# Patient Record
Sex: FEMALE | Race: WHITE | NOT HISPANIC OR LATINO | Employment: UNEMPLOYED | URBAN - NONMETROPOLITAN AREA
[De-identification: names, ages, dates, MRNs, and addresses within clinical notes are randomized per-mention and may not be internally consistent; named-entity substitution may affect disease eponyms.]

---

## 2023-07-27 ENCOUNTER — HOSPITAL ENCOUNTER (OUTPATIENT)
Dept: GENERAL RADIOLOGY | Facility: HOSPITAL | Age: 33
Discharge: HOME | End: 2023-07-27
Admitting: NURSE PRACTITIONER
Payer: COMMERCIAL

## 2023-07-27 ENCOUNTER — OFFICE VISIT (OUTPATIENT)
Dept: UROLOGY | Facility: CLINIC | Age: 33
End: 2023-07-27
Payer: COMMERCIAL

## 2023-07-27 VITALS
HEIGHT: 65 IN | DIASTOLIC BLOOD PRESSURE: 77 MMHG | HEART RATE: 92 BPM | SYSTOLIC BLOOD PRESSURE: 137 MMHG | WEIGHT: 269.6 LBS | BODY MASS INDEX: 44.92 KG/M2

## 2023-07-27 DIAGNOSIS — R10.9 LEFT FLANK PAIN: ICD-10-CM

## 2023-07-27 DIAGNOSIS — N20.1 LEFT URETERAL STONE: Primary | ICD-10-CM

## 2023-07-27 DIAGNOSIS — N30.01 ACUTE CYSTITIS WITH HEMATURIA: ICD-10-CM

## 2023-07-27 PROCEDURE — 74018 RADEX ABDOMEN 1 VIEW: CPT | Performed by: RADIOLOGY

## 2023-07-27 PROCEDURE — 99204 OFFICE O/P NEW MOD 45 MIN: CPT | Performed by: NURSE PRACTITIONER

## 2023-07-27 PROCEDURE — 1160F RVW MEDS BY RX/DR IN RCRD: CPT | Performed by: NURSE PRACTITIONER

## 2023-07-27 PROCEDURE — 1159F MED LIST DOCD IN RCRD: CPT | Performed by: NURSE PRACTITIONER

## 2023-07-27 PROCEDURE — 74018 RADEX ABDOMEN 1 VIEW: CPT

## 2023-07-27 RX ORDER — ALBUTEROL SULFATE 90 UG/1
2 AEROSOL, METERED RESPIRATORY (INHALATION) EVERY 4 HOURS PRN
COMMUNITY
Start: 2023-04-18

## 2023-07-27 RX ORDER — OMEPRAZOLE 40 MG/1
40 CAPSULE, DELAYED RELEASE ORAL DAILY
Qty: 30 CAPSULE | Refills: 2 | COMMUNITY
Start: 2023-06-07 | End: 2023-09-05

## 2023-07-27 NOTE — PROGRESS NOTES
Chief Complaint  LEFT URETERAL STONE/FLANK PAIN (NEW PATIENT WITH UVJ STONE)    Willis Lisa presents to [facility] GASTROENTEROLOGY & UROLOGY UVJ STONE-RESOLVED/ACUTE CYSTITIS/FLANK PAIN  History of Present Illness    Mrs. Romeo Lisa is a pleasant 32-year-old female who presented to the clinic today for evaluation as a new patient. The patient has been referred to the clinic by her PCP secondary to concerns about kidney stones. The patient is also on ER follow-up after initially presenting to [facility] in [place], [place], on 06/29/2023 secondary to complaints of left lower quadrant abdominal pain, side pain, and left flank pain radiating to her lower back. Patient had reported her pain and discomfort started about a month ago, initially intermittent, but had progressed to constant 10/10, radiating into her pelvic region and suprapubic area, causing some discomfort and increased difficulty finding any comfortable lying, or sitting positions.     The patient had bariatric surgery over 6 weeks prior to incident in June, and was doing relatively well, but it was reported that her symptoms had resumed. She also had a UTI 6 weeks prior to that, which was treated with antibiotics and caused some nausea, vomiting, and chills without any fevers.     As such, she had a CT scan of the abdomen and chest completed, which I have reviewed. A CT scan on 06/29/2023 showed a 5-mm stone in the mid- to distal left ureter, causing mild hydroureteronephrosis. There were no stones noted in the left kidney. There were no right kidney or ureteral stones evident. There were no other acute findings evident. She had an incidental single solid nodule that was 6 mm.     On initial presentation in clinic today, she was in no apparent discomfort. She is unable to give any urine for analysis, patient reports she urinated prior to clinic appointment time.  Her PVR 0 mL.  Overall, the  "patient states that she is doing well. She is unsure if she has passed the stone. She denies any current symptoms. She denies any urinary frequency, dysuria, urgency, or hematuria. She states that this is her first kidney stone. She notes that her mother had kidney stones. She has not had a soda in 2 months. She had a gastric sleeve on 06/06/2023. Her appetite is okay.    Her repeat KUB today is also unremarkable with no ureteral stones noted.  Patient most likely passed a stone.      CT ABD/CHEST COMPLETED 06/29/23    1.   5 mm stone in the mid to distal left ureter with mild left hydroureteronephrosis.        2.   No acute posttraumatic findings in the chest, abdomen, or pelvis.    3. Left adrenal adenoma.    4. Incidental single solid nodule <6 mm:     IMPRESSION KUB 07/27/23:    No acute findings.  Vascular phleboliths in the pelvis.    Patient reports a history of uterine cancer diagnosed in 2021, post partial hysterectomy in 2022.     The rest of her PMHx as noted below      Active Ambulatory Problems     Diagnosis Date Noted    Left ureteral stone 07/27/2023    Left flank pain 07/27/2023     Resolved Ambulatory Problems     Diagnosis Date Noted    No Resolved Ambulatory Problems     No Additional Past Medical History      Objective   Vital Signs:   /77   Pulse 92   Ht 165.1 cm (65\")   Wt 122 kg (269 lb 9.6 oz)   BMI 44.86 kg/m²       ROS:   Review of Systems   Constitutional:  Positive for activity change and fatigue. Negative for appetite change, chills, diaphoresis, fever, unexpected weight gain and unexpected weight loss.   HENT:  Negative for congestion, ear discharge, ear pain, nosebleeds, rhinorrhea, sinus pressure and sore throat.    Eyes:  Negative for blurred vision, double vision, photophobia, pain, redness and visual disturbance.   Respiratory:  Negative for apnea, cough, chest tightness, shortness of breath, wheezing and stridor.    Cardiovascular:  Negative for chest pain and " palpitations.   Gastrointestinal:  Positive for abdominal distention, abdominal pain and constipation. Negative for diarrhea, nausea and vomiting.   Endocrine: Negative for polydipsia, polyphagia and polyuria.   Genitourinary:  Positive for pelvic pain, pelvic pressure and urgency. Negative for decreased urine volume, difficulty urinating, dyspareunia, dysuria, flank pain, frequency, genital sores, hematuria, urinary incontinence and vaginal discharge.   Musculoskeletal:  Negative for arthralgias, back pain and joint swelling.   Skin:  Positive for color change and pallor. Negative for rash and wound.   Neurological:  Negative for dizziness, tremors, syncope, weakness, light-headedness, headache, memory problem and confusion.   Psychiatric/Behavioral:  Positive for sleep disturbance and stress. Negative for behavioral problems and decreased concentration.       Physical Exam  Constitutional:       General: She is in acute distress.      Appearance: She is well-developed.   HENT:      Head: Normocephalic and atraumatic.   Eyes:      Pupils: Pupils are equal, round, and reactive to light.   Neck:      Thyroid: No thyromegaly.      Trachea: No tracheal deviation.   Cardiovascular:      Rate and Rhythm: Normal rate and regular rhythm.      Heart sounds: No murmur heard.  Pulmonary:      Effort: Pulmonary effort is normal. No respiratory distress.      Breath sounds: Normal breath sounds. No stridor. No wheezing.   Abdominal:      General: Bowel sounds are normal. There is distension.      Palpations: Abdomen is soft.      Tenderness: There is abdominal tenderness.   Genitourinary:     Labia:         Right: No tenderness.         Left: No tenderness.       Vagina: Normal. No vaginal discharge.      Comments: DYSURIA, FREQUENCY  Musculoskeletal:         General: Tenderness present. No deformity. Normal range of motion.      Cervical back: Normal range of motion.   Skin:     General: Skin is warm and dry.      Capillary  Refill: Capillary refill takes less than 2 seconds.      Coloration: Skin is pale.      Findings: No erythema or rash.   Neurological:      Mental Status: She is alert and oriented to person, place, and time.      Cranial Nerves: No cranial nerve deficit.      Sensory: No sensory deficit.      Motor: Weakness present.      Coordination: Coordination normal.   Psychiatric:         Behavior: Behavior normal.         Thought Content: Thought content normal.         Judgment: Judgment normal.      Result Review :                Assessment and Plan    Problem List Items Addressed This Visit          Gastrointestinal Abdominal     Left flank pain    Relevant Orders    XR abdomen kub (Completed)       Genitourinary and Reproductive     Left ureteral stone - Primary    Relevant Orders    XR abdomen kub (Completed)     Other Visit Diagnoses       Acute cystitis with hematuria        Relevant Orders    XR abdomen kub (Completed)                                                         ASSESSMENT   BILATERAL Nephrolithiasis/ L>R FLANK PAIN /IBS -Constipation/CYSTITIS    Mrs. Romeo Lisa is a pleasant 32-year-old female who presented to the clinic today for evaluation secondary to concerns about kidney stones.patient had initially presented to ED  with LEFT>RIGHT flank pain that was worsening and causing  significant abdominal pain, flank pain, pelvic pressure suprapubic discomfort, nausea and vomiting, stomach pain and discomfort requiring ER evaluation.  A CT scan had showed 6 mm left UPJ stone.  Nevertheless her repeat KUB today is unremarkable, and patient denies any bothersome urinary symptoms-most likely she passed a stone.    TODAY, We discussed  the various parameters regarding spontaneous passage including the notion that a tiny 1-4 mm stone has a 95% high likelihood of spontaneous passage versus a larger stone of >6 mm like she has being caught up in the upper areas of the urinary tract. We also discussed the  medical management of stone disease and the use of medical expulsive therapy in the form of Flomax.     This is used in an off label setting. I also talked about nonoperative management including ambulation and increasing fluids and hot tub as being an effective adjuncts in the treatment of a stone. We discussed the absolute relative indicators for intervention including the presence of sepsis, and pain we cannot control as the primary need for urgent intervention.  We discussed placement of a stent if indicated and the management of the stent as well.        PLAN  She will continue conservative therapy with Toradol as needed for any pain/discomfort     We discussed treatment options for her flank pain with patient encouraged to continue conservative therapy alternating NSAID/Tylenol as tolerated, Also including hot baths, showers, warm compresses to lower back as tolerated. Also encouraged walking, physical therapy, light exercises as tolerated    Rest is the most important treatment in the case of flank pain. Rest and physical therapy are enough to improve minor pain. Discussed to monitor her daily routine with prevention of flank pain by: At least Drinking 8 glasses of water per day, Limiting your alcohol consumption.  Having a healthy diet containing fruits, vegetables, and a lot of fluids, Practicing safe sex.  Also maintaining proper hygiene of your body as well as the environment.    Discussed a kidney stone prevention diet to include increasing p.o. fluid intake, to at least 1 to 2 L of water daily.  She is to avoid caffeine products such as cola, coffee, and to avoid soft or soda drinks.  She is to decrease her sodium consumption as in  Fast foods, day, salted nuts, canned foods, and smoked/cured foods.     She is also to decrease her oxalate consumption, as in spinach, Katelyn greens, and Rhubarb.  Also important is to decrease protein intake, as in red meats, peanut butter, and also avoid  nuts.    Continue all other medications    Strict bowel regiment due to risk for constipation    Follow-up in clinic as discussed,     May return sooner if need be    Patient agreeable plan of care    Patient reports that she is not currently experiencing any symptoms of urinary incontinence.    BMI cannot be calculated due to outdated height or weight values.  Please input a current height/weight in Vitals and re-renter BMIFOLLOWUP in Note to pull in correct documentation based on BMI range.    RADIOLOGY (CT AND/OR KUB):    CT Abdomen and Pelvis: No results found for this or any previous visit.     CT Stone Protocol: No results found for this or any previous visit.     KUB: No results found for this or any previous visit.       Pending KUB for definitive plan of care. If stone is still present, we will schedule her for surgery.  LABS (3 MONTHS):    No results found for any previous visit.        Smoking Cessation Counseling:  Never a smoker.  Patient does not currently use any tobacco products.       Follow Up   Return in about 6 months (around 1/29/2024) for Next scheduled follow up KIDNEY STONES/, RECURRENT UTI/DYSURIA/DETRUSSOR INSTABILITY.    Patient was given instructions and counseling regarding her condition or for health maintenance advice. Please see specific information pulled into the AVS if appropriate.          This document has been electronically signed by Griselda Cheng-Akwa, APRN   July 27, 2023 17:17 EDT      Dictated Utilizing Dragon Dictation: Part of this note may be an electronic transcription/translation of spoken language to printed text using the Dragon Dictation System.

## 2023-07-27 NOTE — LETTER
July 27, 2023     SHARONDA Salmon  [address] [address]    Patient: Romeo Lisa   YOB: 1990   Date of Visit: 7/27/2023       Dear SHARONDA Salmon,    Thank you for referring Romeo Lisa to me for evaluation. Below is a copy of my consult note.    If you have questions, please do not hesitate to call me. I look forward to following Romeo along with you.         Sincerely,        Griselda Cheng-Akwa, APRN        CC: No Recipients    Chief Complaint  LEFT URETERAL STONE/FLANK PAIN (NEW PATIENT WITH UVJ STONE)    Subjective          Romeo Lisa presents to [facility] GASTROENTEROLOGY & UROLOGY UVJ STONE-RESOLVED/ACUTE CYSTITIS/FLANK PAIN  History of Present Illness    Mrs. Romeo Lisa is a pleasant 32-year-old female who presented to the clinic today for evaluation as a new patient. The patient has been referred to the clinic by her PCP secondary to concerns about kidney stones. The patient is also on ER follow-up after initially presenting to [facility] in [place], [place], on 06/29/2023 secondary to complaints of left lower quadrant abdominal pain, side pain, and left flank pain radiating to her lower back. Patient had reported her pain and discomfort started about a month ago, initially intermittent, but had progressed to constant 10/10, radiating into her pelvic region and suprapubic area, causing some discomfort and increased difficulty finding any comfortable lying, or sitting positions.     The patient had bariatric surgery over 6 weeks prior to incident in June, and was doing relatively well, but it was reported that her symptoms had resumed. She also had a UTI 6 weeks prior to that, which was treated with antibiotics and caused some nausea, vomiting, and chills without any fevers.     As such, she had a CT scan of the abdomen and chest completed, which I have reviewed. A CT scan on 06/29/2023 showed a  "5-mm stone in the mid- to distal left ureter, causing mild hydroureteronephrosis. There were no stones noted in the left kidney. There were no right kidney or ureteral stones evident. There were no other acute findings evident. She had an incidental single solid nodule that was 6 mm.     On initial presentation in clinic today, she was in no apparent discomfort. She is unable to give any urine for analysis, patient reports she urinated prior to clinic appointment time.  Her PVR 0 mL.  Overall, the patient states that she is doing well. She is unsure if she has passed the stone. She denies any current symptoms. She denies any urinary frequency, dysuria, urgency, or hematuria. She states that this is her first kidney stone. She notes that her mother had kidney stones. She has not had a soda in 2 months. She had a gastric sleeve on 06/06/2023. Her appetite is okay.    Her repeat KUB today is also unremarkable with no ureteral stones noted.  Patient most likely passed a stone.      CT ABD/CHEST COMPLETED 06/29/23    1.   5 mm stone in the mid to distal left ureter with mild left hydroureteronephrosis.        2.   No acute posttraumatic findings in the chest, abdomen, or pelvis.    3. Left adrenal adenoma.    4. Incidental single solid nodule <6 mm:     IMPRESSION KUB 07/27/23:    No acute findings.  Vascular phleboliths in the pelvis.    Patient reports a history of uterine cancer diagnosed in 2021, post partial hysterectomy in 2022.     The rest of her PMHx as noted below      Active Ambulatory Problems     Diagnosis Date Noted   • Left ureteral stone 07/27/2023   • Left flank pain 07/27/2023     Resolved Ambulatory Problems     Diagnosis Date Noted   • No Resolved Ambulatory Problems     No Additional Past Medical History      Objective   Vital Signs:   /77   Pulse 92   Ht 165.1 cm (65\")   Wt 122 kg (269 lb 9.6 oz)   BMI 44.86 kg/m²       ROS:   Review of Systems   Constitutional:  Positive for activity " change and fatigue. Negative for appetite change, chills, diaphoresis, fever, unexpected weight gain and unexpected weight loss.   HENT:  Negative for congestion, ear discharge, ear pain, nosebleeds, rhinorrhea, sinus pressure and sore throat.    Eyes:  Negative for blurred vision, double vision, photophobia, pain, redness and visual disturbance.   Respiratory:  Negative for apnea, cough, chest tightness, shortness of breath, wheezing and stridor.    Cardiovascular:  Negative for chest pain and palpitations.   Gastrointestinal:  Positive for abdominal distention, abdominal pain and constipation. Negative for diarrhea, nausea and vomiting.   Endocrine: Negative for polydipsia, polyphagia and polyuria.   Genitourinary:  Positive for pelvic pain, pelvic pressure and urgency. Negative for decreased urine volume, difficulty urinating, dyspareunia, dysuria, flank pain, frequency, genital sores, hematuria, urinary incontinence and vaginal discharge.   Musculoskeletal:  Negative for arthralgias, back pain and joint swelling.   Skin:  Positive for color change and pallor. Negative for rash and wound.   Neurological:  Negative for dizziness, tremors, syncope, weakness, light-headedness, headache, memory problem and confusion.   Psychiatric/Behavioral:  Positive for sleep disturbance and stress. Negative for behavioral problems and decreased concentration.       Physical Exam  Constitutional:       General: She is in acute distress.      Appearance: She is well-developed.   HENT:      Head: Normocephalic and atraumatic.   Eyes:      Pupils: Pupils are equal, round, and reactive to light.   Neck:      Thyroid: No thyromegaly.      Trachea: No tracheal deviation.   Cardiovascular:      Rate and Rhythm: Normal rate and regular rhythm.      Heart sounds: No murmur heard.  Pulmonary:      Effort: Pulmonary effort is normal. No respiratory distress.      Breath sounds: Normal breath sounds. No stridor. No wheezing.   Abdominal:       General: Bowel sounds are normal. There is distension.      Palpations: Abdomen is soft.      Tenderness: There is abdominal tenderness.   Genitourinary:     Labia:         Right: No tenderness.         Left: No tenderness.       Vagina: Normal. No vaginal discharge.      Comments: DYSURIA, FREQUENCY  Musculoskeletal:         General: Tenderness present. No deformity. Normal range of motion.      Cervical back: Normal range of motion.   Skin:     General: Skin is warm and dry.      Capillary Refill: Capillary refill takes less than 2 seconds.      Coloration: Skin is pale.      Findings: No erythema or rash.   Neurological:      Mental Status: She is alert and oriented to person, place, and time.      Cranial Nerves: No cranial nerve deficit.      Sensory: No sensory deficit.      Motor: Weakness present.      Coordination: Coordination normal.   Psychiatric:         Behavior: Behavior normal.         Thought Content: Thought content normal.         Judgment: Judgment normal.      Result Review :                Assessment and Plan    Problem List Items Addressed This Visit          Gastrointestinal Abdominal     Left flank pain    Relevant Orders    XR abdomen kub (Completed)       Genitourinary and Reproductive     Left ureteral stone - Primary    Relevant Orders    XR abdomen kub (Completed)     Other Visit Diagnoses       Acute cystitis with hematuria        Relevant Orders    XR abdomen kub (Completed)                                                         ASSESSMENT   BILATERAL Nephrolithiasis/ L>R FLANK PAIN /IBS -Constipation/CYSTITIS    Mrs. Romeo Lisa is a pleasant 32-year-old female who presented to the clinic today for evaluation secondary to concerns about kidney stones.patient had initially presented to ED  with LEFT>RIGHT flank pain that was worsening and causing  significant abdominal pain, flank pain, pelvic pressure suprapubic discomfort, nausea and vomiting, stomach pain and discomfort  requiring ER evaluation.  A CT scan had showed 6 mm left UPJ stone.  Nevertheless her repeat KUB today is unremarkable, and patient denies any bothersome urinary symptoms-most likely she passed a stone.    TODAY, We discussed  the various parameters regarding spontaneous passage including the notion that a tiny 1-4 mm stone has a 95% high likelihood of spontaneous passage versus a larger stone of >6 mm like she has being caught up in the upper areas of the urinary tract. We also discussed the medical management of stone disease and the use of medical expulsive therapy in the form of Flomax.     This is used in an off label setting. I also talked about nonoperative management including ambulation and increasing fluids and hot tub as being an effective adjuncts in the treatment of a stone. We discussed the absolute relative indicators for intervention including the presence of sepsis, and pain we cannot control as the primary need for urgent intervention.  We discussed placement of a stent if indicated and the management of the stent as well.        PLAN  She will continue conservative therapy with Toradol as needed for any pain/discomfort     We discussed treatment options for her flank pain with patient encouraged to continue conservative therapy alternating NSAID/Tylenol as tolerated, Also including hot baths, showers, warm compresses to lower back as tolerated. Also encouraged walking, physical therapy, light exercises as tolerated    Rest is the most important treatment in the case of flank pain. Rest and physical therapy are enough to improve minor pain. Discussed to monitor her daily routine with prevention of flank pain by: At least Drinking 8 glasses of water per day, Limiting your alcohol consumption.  Having a healthy diet containing fruits, vegetables, and a lot of fluids, Practicing safe sex.  Also maintaining proper hygiene of your body as well as the environment.    Discussed a kidney stone prevention  diet to include increasing p.o. fluid intake, to at least 1 to 2 L of water daily.  She is to avoid caffeine products such as cola, coffee, and to avoid soft or soda drinks.  She is to decrease her sodium consumption as in  Fast foods, day, salted nuts, canned foods, and smoked/cured foods.     She is also to decrease her oxalate consumption, as in spinach, Katelyn greens, and Rhubarb.  Also important is to decrease protein intake, as in red meats, peanut butter, and also avoid nuts.    Continue all other medications    Strict bowel regiment due to risk for constipation    Follow-up in clinic as discussed,     May return sooner if need be    Patient agreeable plan of care    Patient reports that she is not currently experiencing any symptoms of urinary incontinence.    BMI cannot be calculated due to outdated height or weight values.  Please input a current height/weight in Vitals and re-renter BMIFOLLOWUP in Note to pull in correct documentation based on BMI range.    RADIOLOGY (CT AND/OR KUB):    CT Abdomen and Pelvis: No results found for this or any previous visit.     CT Stone Protocol: No results found for this or any previous visit.     KUB: No results found for this or any previous visit.       Pending KUB for definitive plan of care. If stone is still present, we will schedule her for surgery.  LABS (3 MONTHS):    No results found for any previous visit.        Smoking Cessation Counseling:  Never a smoker.  Patient does not currently use any tobacco products.       Follow Up   Return in about 6 months (around 1/29/2024) for Next scheduled follow up KIDNEY STONES/, RECURRENT UTI/DYSURIA/DETRUSSOR INSTABILITY.    Patient was given instructions and counseling regarding her condition or for health maintenance advice. Please see specific information pulled into the AVS if appropriate.          This document has been electronically signed by Griselda Cheng-Akwa, APRN   July 27, 2023 17:17 EDT      Dictated  Utilizing Dragon Dictation: Part of this note may be an electronic transcription/translation of spoken language to printed text using the Dragon Dictation System.

## 2023-10-09 ENCOUNTER — OFFICE VISIT (OUTPATIENT)
Dept: UROLOGY | Facility: CLINIC | Age: 33
End: 2023-10-09
Payer: COMMERCIAL

## 2023-10-09 VITALS
BODY MASS INDEX: 38.92 KG/M2 | HEIGHT: 65 IN | DIASTOLIC BLOOD PRESSURE: 85 MMHG | HEART RATE: 94 BPM | SYSTOLIC BLOOD PRESSURE: 127 MMHG | WEIGHT: 233.6 LBS

## 2023-10-09 DIAGNOSIS — N20.1 LEFT URETERAL STONE: ICD-10-CM

## 2023-10-09 DIAGNOSIS — R10.32 LEFT LOWER QUADRANT ABDOMINAL PAIN: ICD-10-CM

## 2023-10-09 DIAGNOSIS — K58.1 IRRITABLE BOWEL SYNDROME WITH CONSTIPATION: ICD-10-CM

## 2023-10-09 DIAGNOSIS — R10.9 ACUTE LEFT FLANK PAIN: ICD-10-CM

## 2023-10-09 DIAGNOSIS — R10.9 LEFT FLANK PAIN: Primary | ICD-10-CM

## 2023-10-09 LAB
BILIRUB BLD-MCNC: ABNORMAL MG/DL
CLARITY, POC: ABNORMAL
COLOR UR: YELLOW
EXPIRATION DATE: ABNORMAL
GLUCOSE UR STRIP-MCNC: NEGATIVE MG/DL
KETONES UR QL: NEGATIVE
LEUKOCYTE EST, POC: ABNORMAL
Lab: ABNORMAL
NITRITE UR-MCNC: NEGATIVE MG/ML
PH UR: 6 [PH] (ref 5–8)
PROT UR STRIP-MCNC: ABNORMAL MG/DL
RBC # UR STRIP: NEGATIVE /UL
SP GR UR: 1.02 (ref 1–1.03)
UROBILINOGEN UR QL: ABNORMAL

## 2023-10-09 PROCEDURE — 1159F MED LIST DOCD IN RCRD: CPT | Performed by: NURSE PRACTITIONER

## 2023-10-09 PROCEDURE — 51798 US URINE CAPACITY MEASURE: CPT | Performed by: NURSE PRACTITIONER

## 2023-10-09 PROCEDURE — 1160F RVW MEDS BY RX/DR IN RCRD: CPT | Performed by: NURSE PRACTITIONER

## 2023-10-09 PROCEDURE — 99214 OFFICE O/P EST MOD 30 MIN: CPT | Performed by: NURSE PRACTITIONER

## 2023-10-09 RX ORDER — TIRZEPATIDE 12.5 MG/.5ML
INJECTION, SOLUTION SUBCUTANEOUS
COMMUNITY
Start: 2023-09-29

## 2023-10-09 RX ORDER — IBUPROFEN 800 MG/1
800 TABLET ORAL EVERY 8 HOURS PRN
Qty: 30 TABLET | Refills: 1 | Status: SHIPPED | OUTPATIENT
Start: 2023-10-09

## 2023-10-09 RX ORDER — LEVOTHYROXINE SODIUM 88 UG/1
88 TABLET ORAL DAILY
COMMUNITY

## 2023-10-09 RX ORDER — ONDANSETRON 4 MG/1
4 TABLET, FILM COATED ORAL EVERY 12 HOURS PRN
Qty: 20 TABLET | Refills: 0 | Status: SHIPPED | OUTPATIENT
Start: 2023-10-09

## 2023-10-09 RX ORDER — POLYETHYLENE GLYCOL 3350 17 G/17G
17 POWDER, FOR SOLUTION ORAL DAILY
Qty: 30 EACH | Refills: 3 | Status: SHIPPED | OUTPATIENT
Start: 2023-10-09

## 2023-10-09 RX ORDER — TAMSULOSIN HYDROCHLORIDE 0.4 MG/1
1 CAPSULE ORAL DAILY
Qty: 30 CAPSULE | Refills: 5 | Status: SHIPPED | OUTPATIENT
Start: 2023-10-09

## 2023-10-09 RX ORDER — SENNOSIDES A AND B 8.6 MG/1
1 TABLET, FILM COATED ORAL DAILY
Qty: 60 TABLET | Refills: 1 | Status: SHIPPED | OUTPATIENT
Start: 2023-10-09

## 2023-10-09 NOTE — PROGRESS NOTES
Chief Complaint  Kidney Stone (10 WEEK FOLLOW UP FOR FLANK PAIN/ABDOMINAL PAIN)    Subjective          Romeo Lisa presents to [facility] for LEFT FLANK/ABDOMINAL PAIN/-EVAL LEFT URETERAL STONE/IBS-C  History of Present Illness    Ms. Romeo Lisa is a pleasant 32-year-old female who returns to clinic today for evaluation. This is a 3-month follow-up. Initially evaluated on 07/27/2023, patient had presented with concerns of kidney stones. She was on ER follow-up, presented to [facility] in [place] secondary to colicky, left greater than right flank pain and abdominal pain that had become very bothersome to her. She had a CT abdomen and pelvis completed back on 06/29/2023 which showed a 5 mm distal left ureteral stone at the time causing mild hydronephrosis or hydroureter. There were no right kidney or ureteral stones evident on CT. We did complete a repeat KUB in the clinic secondary to persistent pain and patient unsure if she passed her prior stone. Her KUB was unremarkable with no kidney stones noted on CT on a KUB. The patient had likely passed a stone, SHE DID CONFIRM SEEING STONE IN TOILET.     However, she returns to clinic today in apparent discomfort reporting lower abdominal pain, pelvic pressure and suprapubic discomfort. SHE REPORTS LEFT FLANK PAIN THAT HAS BEEN COLICKY, RADIATING TO LOWER BACK. SHE DENIES DYSURIA, DENIES BURNING WITH URINATION.Her urinalysis does show 1+ leukocyte esterase. It is negative for nitrite. It is negative for growth/microscopic hematuria. Patient's PVR is 0 mL.    OVERALL, he patient is doing well. On 10/06/2023 her pain STARTED suddenly. On 10/07/2023 she started to have pain in her back AGAIN. The pain is on the left side. The pain is the same pain she had when she had a kidney stone. She denies any constipation. She denies any hematochezia, but it is hard to urinate. When she urinates, it feels like she does  "not fully void her bladder. It feels as though something is blocked in or holding it up. The pain causes her nausea. She denies any pain today. She felt as though she passed the stone. On 10/06/2023 she was sitting at her son's haircut, and she felt \"really uncomfortable.\" When she got to the car her whole back side was hurting. She denies taking any medication for her bowels. She denies any swelling in her legs. She states she is drinking enough fluids. She states she had a catheter when she had her hysterectomy. She states since then she has not been urinating like she was before. On 10/06/2023 while driving home she felt something pop in her urine hole. She feels like she is straining to urinate. After urinating the discomfort goes away for a few seconds.    The patient is taking Mounjaro 12.5 mg injection since 01/2023. When she first started taking Mounjaro it made her sick. She denies a diagnosis of gout., DENIES EVER HAVING URIC ACID STONES.    Active Ambulatory Problems     Diagnosis Date Noted    Left ureteral stone 07/27/2023    Left flank pain 07/27/2023     Resolved Ambulatory Problems     Diagnosis Date Noted    No Resolved Ambulatory Problems     No Additional Past Medical History      Objective   Vital Signs:   /85 (BP Location: Right arm, Patient Position: Sitting, Cuff Size: Adult)   Pulse 94   Ht 165.1 cm (65\")   Wt 106 kg (233 lb 9.6 oz)   BMI 38.87 kg/mý       ROS:   Review of Systems   Constitutional:  Positive for activity change, appetite change and unexpected weight gain. Negative for chills, diaphoresis, fatigue, fever and unexpected weight loss.   HENT:  Negative for congestion, ear discharge, ear pain, nosebleeds, rhinorrhea, sinus pressure and sore throat.    Eyes:  Negative for blurred vision, double vision, photophobia, pain, redness and visual disturbance.   Respiratory:  Negative for apnea, cough, chest tightness, shortness of breath, wheezing and stridor.    Cardiovascular: "  Negative for chest pain and palpitations.   Gastrointestinal:  Positive for abdominal distention, abdominal pain, constipation and nausea. Negative for diarrhea and vomiting.   Endocrine: Negative for polydipsia, polyphagia and polyuria.   Genitourinary:  Positive for difficulty urinating, dysuria, flank pain, pelvic pain and pelvic pressure. Negative for decreased urine volume, dyspareunia, frequency, genital sores, hematuria, urgency, urinary incontinence and vaginal discharge.   Musculoskeletal:  Positive for back pain and myalgias. Negative for arthralgias and joint swelling.   Skin:  Positive for pallor. Negative for rash and wound.   Neurological:  Negative for dizziness, tremors, syncope, weakness, light-headedness, headache, memory problem and confusion.   Psychiatric/Behavioral:  Positive for sleep disturbance and stress. Negative for behavioral problems and decreased concentration.         Physical Exam  Constitutional:       General: She is in acute distress.      Appearance: She is well-developed. She is obese. She is ill-appearing.   HENT:      Head: Normocephalic and atraumatic.   Eyes:      Pupils: Pupils are equal, round, and reactive to light.   Neck:      Thyroid: No thyromegaly.      Trachea: No tracheal deviation.   Cardiovascular:      Rate and Rhythm: Normal rate and regular rhythm.      Heart sounds: No murmur heard.  Pulmonary:      Effort: Pulmonary effort is normal. No respiratory distress.      Breath sounds: Normal breath sounds. No stridor. No wheezing.   Abdominal:      General: Bowel sounds are normal. There is distension.      Palpations: Abdomen is soft.      Tenderness: There is abdominal tenderness. There is guarding.   Genitourinary:     Labia:         Right: No tenderness.         Left: No tenderness.       Vagina: Normal. No vaginal discharge.      Comments: DYSURIA, PELVIC PRESSURE/STRAING WITH URINATION, HEMATURIA  Musculoskeletal:         General: Tenderness present. No  deformity. Normal range of motion.      Cervical back: Normal range of motion.   Skin:     General: Skin is warm and dry.      Capillary Refill: Capillary refill takes less than 2 seconds.      Coloration: Skin is pale.      Findings: No erythema or rash.   Neurological:      Mental Status: She is alert and oriented to person, place, and time.      Cranial Nerves: No cranial nerve deficit.      Sensory: No sensory deficit.      Motor: Weakness present.      Coordination: Coordination normal.   Psychiatric:         Behavior: Behavior normal.         Thought Content: Thought content normal.         Judgment: Judgment normal.        Result Review :     UA          10/9/2023    11:22   Urinalysis   Ketones, UA Negative    Leukocytes, UA Small (1+)             Assessment and Plan    Problem List Items Addressed This Visit          Gastrointestinal Abdominal     Left flank pain - Primary    Relevant Medications    tamsulosin (FLOMAX) 0.4 MG capsule 24 hr capsule    ibuprofen (ADVIL,MOTRIN) 800 MG tablet    ondansetron (Zofran) 4 MG tablet    polyethylene glycol (MiraLax) 17 g packet    senna (Senokot) 8.6 MG tablet    Other Relevant Orders    POC Urinalysis Dipstick, Automated (Completed)    CT Abdomen Pelvis Stone Protocol       Genitourinary and Reproductive     Left ureteral stone    Relevant Medications    tamsulosin (FLOMAX) 0.4 MG capsule 24 hr capsule    ibuprofen (ADVIL,MOTRIN) 800 MG tablet    ondansetron (Zofran) 4 MG tablet    polyethylene glycol (MiraLax) 17 g packet    senna (Senokot) 8.6 MG tablet    Other Relevant Orders    CT Abdomen Pelvis Stone Protocol     Other Visit Diagnoses       Left lower quadrant abdominal pain        Relevant Medications    tamsulosin (FLOMAX) 0.4 MG capsule 24 hr capsule    ibuprofen (ADVIL,MOTRIN) 800 MG tablet    ondansetron (Zofran) 4 MG tablet    polyethylene glycol (MiraLax) 17 g packet    senna (Senokot) 8.6 MG tablet    Other Relevant Orders    CT Abdomen Pelvis Stone  Protocol    Acute left flank pain        Relevant Medications    tamsulosin (FLOMAX) 0.4 MG capsule 24 hr capsule    ibuprofen (ADVIL,MOTRIN) 800 MG tablet    ondansetron (Zofran) 4 MG tablet    polyethylene glycol (MiraLax) 17 g packet    senna (Senokot) 8.6 MG tablet    Other Relevant Orders    CT Abdomen Pelvis Stone Protocol    Irritable bowel syndrome with constipation                                                               ASSESSMENT   BILATERAL Nephrolithiasis/ L>R FLANK PAIN /IBS -Constipation/CYSTITIS     Mrs. Romeo Lisa is a pleasant 32-year-old female who presented to the clinic today for RE-Evaluation secondary to concerns about kidney stones. Patient had initially presented to [facility]  with LEFT>RIGHT flank pain that was worsening and causing  significant abdominal pain, flank pain, pelvic pressure suprapubic discomfort, nausea and vomiting, stomach pain and discomfort-RESOLVED.  A prior CT scan had showed 6 mm left UPJ stone.  Nevertheless her repeat KUB was unremarkable, and patient denied any bothersome urinary symptoms until recently.    Today 10/9/2023,THE  patient reports she has been in apparent discomfort  with left >than right flank pain, radiating to left lower quadrant abdomen, causing significant pelvic pressure and suprapubic discomfort.  She reports straining on urination, and constant feeling of incomplete bladder emptying.  Her urinalysis today however showed 1+ leukocyte esterase, it is negative for nitrites, it is negative for gross/microscopic hematuria.     TODAY, We discussed  the various parameters regarding spontaneous passage including the notion that a tiny 1-4 mm stone has a 95% high likelihood of spontaneous passage versus a larger stone of >6 mm like she has being caught up in the upper areas of the urinary tract. We also discussed the medical management of stone disease and the use of medical expulsive therapy in the form of Flomax.      This is used in  an off label setting. I also talked about nonoperative management including ambulation and increasing fluids and hot tub as being an effective adjuncts in the treatment of a stone. We discussed the absolute relative indicators for intervention including the presence of sepsis, and pain we cannot control as the primary need for urgent intervention.  We discussed placement of a stent if indicated and the management of the stent as well.                                                                  PLAN.  DISCUSSED REPEATING CT STONE PROTOCOL FOR DEFINITIVE PLAN OF CARE    WILL RESUME FLOMAX 0.4 MG DAILY FOR LUTS WITH DYSURIA/STRAINING WITH URINATION    She will Continue conservative therapy with MOTRIN as needed for any pain/discomfort      We discussed treatment options for her flank pain with patient encouraged to continue conservative therapy alternating NSAID/Tylenol as tolerated, Also including hot baths, showers, warm compresses to lower back as tolerated. Also encouraged walking, physical therapy, light exercises as tolerated     Rest is the most important treatment in the case of flank pain. Rest and physical therapy are enough to improve minor pain. Discussed to monitor her daily routine with prevention of flank pain by: At least Drinking 8 glasses of water per day, Limiting your alcohol consumption.  Having a healthy diet containing fruits, vegetables, and a lot of fluids, Practicing safe sex.  Also maintaining proper hygiene of your body as well as the environment.     Discussed a kidney stone prevention diet to include increasing p.o. fluid intake, to at least 1 to 2 L of water daily.  She is to avoid caffeine products such as cola, coffee, and to avoid soft or soda drinks.  She is to decrease her sodium consumption as in  Fast foods, day, salted nuts, canned foods, and smoked/cured foods.      She is also to decrease her oxalate consumption, as in spinach, Katelyn greens, and Rhubarb.  Also important  is to decrease protein intake, as in red meats, peanut butter, and also avoid nuts.     Continue all other medications AS PRESCRIBED     Strict bowel regiment due to risk for constipation     Follow-up in clinic as discussed, TO REVIEW CT SCAN RESULTS     May return sooner if need be,     GO TO ER IF ANY WORSENING SYMPTIOMS     Patient agreeable plan of care    Patient reports that she is not currently experiencing any symptoms of urinary incontinence.    Class 3 Severe Obesity (BMI >=40). Obesity-related health conditions include the following: hypertension, dyslipidemias, and GERD. Obesity is improving with lifestyle modifications. BMI is  38.87 kg/M2 . We discussed portion control and increasing exercise.    1. Left flank pain.  2. Constipation.  3. Urethral stricture.    Plan:  - Repeat CT scan in 2 weeks.  - Start MiraLAX 2 capfuls daily.  - Start Senokot 2 tablets daily.  - Start ibuprofen 800 mg as needed.  -Start Zofran as needed.  - Follow up in 2 weeks.      RADIOLOGY (CT AND/OR KUB):    CT Abdomen and Pelvis: No results found for this or any previous visit.     CT Stone Protocol: No results found for this or any previous visit.     KUB: Results for orders placed in visit on 07/27/23    XR abdomen kub    Narrative  EXAM:  XR Abdomen, 1 View    EXAM DATE:  7/27/2023 11:23 AM    CLINICAL HISTORY:  LEFT URETERAL STONE/FLANK PAIN; N20.1-Calculus of ureter;  R10.9-Unspecified abdominal pain; N30.01-Acute cystitis with hematuria    TECHNIQUE:  Frontal supine view of the abdomen/pelvis.    COMPARISON:  No relevant prior studies available.    FINDINGS:  GASTROINTESTINAL TRACT:  Unremarkable.  No dilation.  BONES/JOINTS:  Unremarkable.  VASCULATURE:  Vascular phleboliths in the pelvis.    Impression  No acute findings.  Vascular phleboliths in the pelvis.    This report was finalized on 7/27/2023 11:30 AM by Dr. Charles Wynne MD.       LABS (3 MONTHS):    Office Visit on 10/09/2023   Component Date Value Ref Range  Status    Color 10/09/2023 Yellow  Yellow, Straw, Dark Yellow, Andreina Final    Clarity, UA 10/09/2023 Cloudy (A)  Clear Final    Specific Gravity  10/09/2023 1.025  1.005 - 1.030 Final    pH, Urine 10/09/2023 6.0  5.0 - 8.0 Final    Leukocytes 10/09/2023 Small (1+) (A)  Negative Final    Nitrite, UA 10/09/2023 Negative  Negative Final    Protein, POC 10/09/2023 1+ (A)  Negative mg/dL Final    Glucose, UA 10/09/2023 Negative  Negative mg/dL Final    Ketones, UA 10/09/2023 Negative  Negative Final    Urobilinogen, UA 10/09/2023 0.2 E.U./dL  Normal, 0.2 E.U./dL Final    Bilirubin 10/09/2023 Small (1+) (A)  Negative Final    Blood, UA 10/09/2023 Negative  Negative Final    Lot Number 10/09/2023 [id number]   Final    Expiration Date 10/09/2023 10/23/2024   Final        Smoking Cessation Counseling:  Never a smoker.  Patient does not currently use any tobacco products.       Follow Up   Return in about 15 days (around 10/24/2023) for Next scheduled follow up, Review CT Scan,EVAL FOR URETHRAL STRICTURE/ UTI/DYSURIA/DETRUSSOR INSTAB..    Patient was given instructions and counseling regarding her condition or for health maintenance advice. Please see specific information pulled into the AVS if appropriate.          This document has been electronically signed by Griselda Cheng-Akwa, APRN   October 9, 2023 23:34 EDT      Dictated Utilizing Dragon Dictation: Part of this note may be an electronic transcription/translation of spoken language to printed text using the Dragon Dictation System.     Transcribed from ambient dictation for Griselda Cheng-Akwa, APRN by Esme Hathaway.  10/09/23   13:05 EDT    Patient or patient representative verbalized consent to the visit recording.  I have personally performed the services described in this document as transcribed by the above individual, and it is both accurate and complete.

## 2023-10-09 NOTE — H&P (VIEW-ONLY)
Chief Complaint  Kidney Stone (10 WEEK FOLLOW UP FOR FLANK PAIN/ABDOMINAL PAIN)    Subjective          Romeo Lisa presents to [facility] for LEFT FLANK/ABDOMINAL PAIN/-EVAL LEFT URETERAL STONE/IBS-C  History of Present Illness    Ms. Romeo Lisa is a pleasant 32-year-old female who returns to clinic today for evaluation. This is a 3-month follow-up. Initially evaluated on 07/27/2023, patient had presented with concerns of kidney stones. She was on ER follow-up, presented to [facility] in [place] secondary to colicky, left greater than right flank pain and abdominal pain that had become very bothersome to her. She had a CT abdomen and pelvis completed back on 06/29/2023 which showed a 5 mm distal left ureteral stone at the time causing mild hydronephrosis or hydroureter. There were no right kidney or ureteral stones evident on CT. We did complete a repeat KUB in the clinic secondary to persistent pain and patient unsure if she passed her prior stone. Her KUB was unremarkable with no kidney stones noted on CT on a KUB. The patient had likely passed a stone, SHE DID CONFIRM SEEING STONE IN TOILET.     However, she returns to clinic today in apparent discomfort reporting lower abdominal pain, pelvic pressure and suprapubic discomfort. SHE REPORTS LEFT FLANK PAIN THAT HAS BEEN COLICKY, RADIATING TO LOWER BACK. SHE DENIES DYSURIA, DENIES BURNING WITH URINATION.Her urinalysis does show 1+ leukocyte esterase. It is negative for nitrite. It is negative for growth/microscopic hematuria. Patient's PVR is 0 mL.    OVERALL, he patient is doing well. On 10/06/2023 her pain STARTED suddenly. On 10/07/2023 she started to have pain in her back AGAIN. The pain is on the left side. The pain is the same pain she had when she had a kidney stone. She denies any constipation. She denies any hematochezia, but it is hard to urinate. When she urinates, it feels like she does  "not fully void her bladder. It feels as though something is blocked in or holding it up. The pain causes her nausea. She denies any pain today. She felt as though she passed the stone. On 10/06/2023 she was sitting at her son's haircut, and she felt \"really uncomfortable.\" When she got to the car her whole back side was hurting. She denies taking any medication for her bowels. She denies any swelling in her legs. She states she is drinking enough fluids. She states she had a catheter when she had her hysterectomy. She states since then she has not been urinating like she was before. On 10/06/2023 while driving home she felt something pop in her urine hole. She feels like she is straining to urinate. After urinating the discomfort goes away for a few seconds.    The patient is taking Mounjaro 12.5 mg injection since 01/2023. When she first started taking Mounjaro it made her sick. She denies a diagnosis of gout., DENIES EVER HAVING URIC ACID STONES.    Active Ambulatory Problems     Diagnosis Date Noted    Left ureteral stone 07/27/2023    Left flank pain 07/27/2023     Resolved Ambulatory Problems     Diagnosis Date Noted    No Resolved Ambulatory Problems     No Additional Past Medical History      Objective   Vital Signs:   /85 (BP Location: Right arm, Patient Position: Sitting, Cuff Size: Adult)   Pulse 94   Ht 165.1 cm (65\")   Wt 106 kg (233 lb 9.6 oz)   BMI 38.87 kg/m²       ROS:   Review of Systems   Constitutional:  Positive for activity change, appetite change and unexpected weight gain. Negative for chills, diaphoresis, fatigue, fever and unexpected weight loss.   HENT:  Negative for congestion, ear discharge, ear pain, nosebleeds, rhinorrhea, sinus pressure and sore throat.    Eyes:  Negative for blurred vision, double vision, photophobia, pain, redness and visual disturbance.   Respiratory:  Negative for apnea, cough, chest tightness, shortness of breath, wheezing and stridor.    Cardiovascular: "  Negative for chest pain and palpitations.   Gastrointestinal:  Positive for abdominal distention, abdominal pain, constipation and nausea. Negative for diarrhea and vomiting.   Endocrine: Negative for polydipsia, polyphagia and polyuria.   Genitourinary:  Positive for difficulty urinating, dysuria, flank pain, pelvic pain and pelvic pressure. Negative for decreased urine volume, dyspareunia, frequency, genital sores, hematuria, urgency, urinary incontinence and vaginal discharge.   Musculoskeletal:  Positive for back pain and myalgias. Negative for arthralgias and joint swelling.   Skin:  Positive for pallor. Negative for rash and wound.   Neurological:  Negative for dizziness, tremors, syncope, weakness, light-headedness, headache, memory problem and confusion.   Psychiatric/Behavioral:  Positive for sleep disturbance and stress. Negative for behavioral problems and decreased concentration.         Physical Exam  Constitutional:       General: She is in acute distress.      Appearance: She is well-developed. She is obese. She is ill-appearing.   HENT:      Head: Normocephalic and atraumatic.   Eyes:      Pupils: Pupils are equal, round, and reactive to light.   Neck:      Thyroid: No thyromegaly.      Trachea: No tracheal deviation.   Cardiovascular:      Rate and Rhythm: Normal rate and regular rhythm.      Heart sounds: No murmur heard.  Pulmonary:      Effort: Pulmonary effort is normal. No respiratory distress.      Breath sounds: Normal breath sounds. No stridor. No wheezing.   Abdominal:      General: Bowel sounds are normal. There is distension.      Palpations: Abdomen is soft.      Tenderness: There is abdominal tenderness. There is guarding.   Genitourinary:     Labia:         Right: No tenderness.         Left: No tenderness.       Vagina: Normal. No vaginal discharge.      Comments: DYSURIA, PELVIC PRESSURE/STRAING WITH URINATION, HEMATURIA  Musculoskeletal:         General: Tenderness present. No  deformity. Normal range of motion.      Cervical back: Normal range of motion.   Skin:     General: Skin is warm and dry.      Capillary Refill: Capillary refill takes less than 2 seconds.      Coloration: Skin is pale.      Findings: No erythema or rash.   Neurological:      Mental Status: She is alert and oriented to person, place, and time.      Cranial Nerves: No cranial nerve deficit.      Sensory: No sensory deficit.      Motor: Weakness present.      Coordination: Coordination normal.   Psychiatric:         Behavior: Behavior normal.         Thought Content: Thought content normal.         Judgment: Judgment normal.        Result Review :     UA          10/9/2023    11:22   Urinalysis   Ketones, UA Negative    Leukocytes, UA Small (1+)             Assessment and Plan    Problem List Items Addressed This Visit          Gastrointestinal Abdominal     Left flank pain - Primary    Relevant Medications    tamsulosin (FLOMAX) 0.4 MG capsule 24 hr capsule    ibuprofen (ADVIL,MOTRIN) 800 MG tablet    ondansetron (Zofran) 4 MG tablet    polyethylene glycol (MiraLax) 17 g packet    senna (Senokot) 8.6 MG tablet    Other Relevant Orders    POC Urinalysis Dipstick, Automated (Completed)    CT Abdomen Pelvis Stone Protocol       Genitourinary and Reproductive     Left ureteral stone    Relevant Medications    tamsulosin (FLOMAX) 0.4 MG capsule 24 hr capsule    ibuprofen (ADVIL,MOTRIN) 800 MG tablet    ondansetron (Zofran) 4 MG tablet    polyethylene glycol (MiraLax) 17 g packet    senna (Senokot) 8.6 MG tablet    Other Relevant Orders    CT Abdomen Pelvis Stone Protocol     Other Visit Diagnoses       Left lower quadrant abdominal pain        Relevant Medications    tamsulosin (FLOMAX) 0.4 MG capsule 24 hr capsule    ibuprofen (ADVIL,MOTRIN) 800 MG tablet    ondansetron (Zofran) 4 MG tablet    polyethylene glycol (MiraLax) 17 g packet    senna (Senokot) 8.6 MG tablet    Other Relevant Orders    CT Abdomen Pelvis Stone  Protocol    Acute left flank pain        Relevant Medications    tamsulosin (FLOMAX) 0.4 MG capsule 24 hr capsule    ibuprofen (ADVIL,MOTRIN) 800 MG tablet    ondansetron (Zofran) 4 MG tablet    polyethylene glycol (MiraLax) 17 g packet    senna (Senokot) 8.6 MG tablet    Other Relevant Orders    CT Abdomen Pelvis Stone Protocol    Irritable bowel syndrome with constipation                                                               ASSESSMENT   BILATERAL Nephrolithiasis/ L>R FLANK PAIN /IBS -Constipation/CYSTITIS     Mrs. Romeo Lisa is a pleasant 32-year-old female who presented to the clinic today for RE-Evaluation secondary to concerns about kidney stones. Patient had initially presented to [facility]INIC [facility] ED  with LEFT>RIGHT flank pain that was worsening and causing  significant abdominal pain, flank pain, pelvic pressure suprapubic discomfort, nausea and vomiting, stomach pain and discomfort-RESOLVED.  A prior CT scan had showed 6 mm left UPJ stone.  Nevertheless her repeat KUB was unremarkable, and patient denied any bothersome urinary symptoms until recently.    Today 10/9/2023,THE  patient reports she has been in apparent discomfort  with left >than right flank pain, radiating to left lower quadrant abdomen, causing significant pelvic pressure and suprapubic discomfort.  She reports straining on urination, and constant feeling of incomplete bladder emptying.  Her urinalysis today however showed 1+ leukocyte esterase, it is negative for nitrites, it is negative for gross/microscopic hematuria.     TODAY, We discussed  the various parameters regarding spontaneous passage including the notion that a tiny 1-4 mm stone has a 95% high likelihood of spontaneous passage versus a larger stone of >6 mm like she has being caught up in the upper areas of the urinary tract. We also discussed the medical management of stone disease and the use of medical expulsive therapy in the form of Flomax.      This is used in  an off label setting. I also talked about nonoperative management including ambulation and increasing fluids and hot tub as being an effective adjuncts in the treatment of a stone. We discussed the absolute relative indicators for intervention including the presence of sepsis, and pain we cannot control as the primary need for urgent intervention.  We discussed placement of a stent if indicated and the management of the stent as well.                                                                  PLAN.  DISCUSSED REPEATING CT STONE PROTOCOL FOR DEFINITIVE PLAN OF CARE    WILL RESUME FLOMAX 0.4 MG DAILY FOR LUTS WITH DYSURIA/STRAINING WITH URINATION    She will Continue conservative therapy with MOTRIN as needed for any pain/discomfort      We discussed treatment options for her flank pain with patient encouraged to continue conservative therapy alternating NSAID/Tylenol as tolerated, Also including hot baths, showers, warm compresses to lower back as tolerated. Also encouraged walking, physical therapy, light exercises as tolerated     Rest is the most important treatment in the case of flank pain. Rest and physical therapy are enough to improve minor pain. Discussed to monitor her daily routine with prevention of flank pain by: At least Drinking 8 glasses of water per day, Limiting your alcohol consumption.  Having a healthy diet containing fruits, vegetables, and a lot of fluids, Practicing safe sex.  Also maintaining proper hygiene of your body as well as the environment.     Discussed a kidney stone prevention diet to include increasing p.o. fluid intake, to at least 1 to 2 L of water daily.  She is to avoid caffeine products such as cola, coffee, and to avoid soft or soda drinks.  She is to decrease her sodium consumption as in  Fast foods, day, salted nuts, canned foods, and smoked/cured foods.      She is also to decrease her oxalate consumption, as in spinach, Katelyn greens, and Rhubarb.  Also important  is to decrease protein intake, as in red meats, peanut butter, and also avoid nuts.     Continue all other medications AS PRESCRIBED     Strict bowel regiment due to risk for constipation     Follow-up in clinic as discussed, TO REVIEW CT SCAN RESULTS     May return sooner if need be,     GO TO ER IF ANY WORSENING SYMPTIOMS     Patient agreeable plan of care    Patient reports that she is not currently experiencing any symptoms of urinary incontinence.    Class 3 Severe Obesity (BMI >=40). Obesity-related health conditions include the following: hypertension, dyslipidemias, and GERD. Obesity is improving with lifestyle modifications. BMI is  38.87 kg/M2 . We discussed portion control and increasing exercise.    1. Left flank pain.  2. Constipation.  3. Urethral stricture.    Plan:  - Repeat CT scan in 2 weeks.  - Start MiraLAX 2 capfuls daily.  - Start Senokot 2 tablets daily.  - Start ibuprofen 800 mg as needed.  -Start Zofran as needed.  - Follow up in 2 weeks.      RADIOLOGY (CT AND/OR KUB):    CT Abdomen and Pelvis: No results found for this or any previous visit.     CT Stone Protocol: No results found for this or any previous visit.     KUB: Results for orders placed in visit on 07/27/23    XR abdomen kub    Narrative  EXAM:  XR Abdomen, 1 View    EXAM DATE:  7/27/2023 11:23 AM    CLINICAL HISTORY:  LEFT URETERAL STONE/FLANK PAIN; N20.1-Calculus of ureter;  R10.9-Unspecified abdominal pain; N30.01-Acute cystitis with hematuria    TECHNIQUE:  Frontal supine view of the abdomen/pelvis.    COMPARISON:  No relevant prior studies available.    FINDINGS:  GASTROINTESTINAL TRACT:  Unremarkable.  No dilation.  BONES/JOINTS:  Unremarkable.  VASCULATURE:  Vascular phleboliths in the pelvis.    Impression  No acute findings.  Vascular phleboliths in the pelvis.    This report was finalized on 7/27/2023 11:30 AM by Dr. Charles Wynne MD.       LABS (3 MONTHS):    Office Visit on 10/09/2023   Component Date Value Ref Range  Status    Color 10/09/2023 Yellow  Yellow, Straw, Dark Yellow, Andreina Final    Clarity, UA 10/09/2023 Cloudy (A)  Clear Final    Specific Gravity  10/09/2023 1.025  1.005 - 1.030 Final    pH, Urine 10/09/2023 6.0  5.0 - 8.0 Final    Leukocytes 10/09/2023 Small (1+) (A)  Negative Final    Nitrite, UA 10/09/2023 Negative  Negative Final    Protein, POC 10/09/2023 1+ (A)  Negative mg/dL Final    Glucose, UA 10/09/2023 Negative  Negative mg/dL Final    Ketones, UA 10/09/2023 Negative  Negative Final    Urobilinogen, UA 10/09/2023 0.2 E.U./dL  Normal, 0.2 E.U./dL Final    Bilirubin 10/09/2023 Small (1+) (A)  Negative Final    Blood, UA 10/09/2023 Negative  Negative Final    Lot Number 10/09/2023 [id number]   Final    Expiration Date 10/09/2023 10/23/2024   Final        Smoking Cessation Counseling:  Never a smoker.  Patient does not currently use any tobacco products.       Follow Up   Return in about 15 days (around 10/24/2023) for Next scheduled follow up, Review CT Scan,EVAL FOR URETHRAL STRICTURE/ UTI/DYSURIA/DETRUSSOR INSTAB..    Patient was given instructions and counseling regarding her condition or for health maintenance advice. Please see specific information pulled into the AVS if appropriate.          This document has been electronically signed by Griselda Cheng-Akwa, APRN   October 9, 2023 23:34 EDT      Dictated Utilizing Dragon Dictation: Part of this note may be an electronic transcription/translation of spoken language to printed text using the Dragon Dictation System.     Transcribed from ambient dictation for Griselda Cheng-Akwa, APRN by Esme Hathaway.  10/09/23   13:05 EDT    Patient or patient representative verbalized consent to the visit recording.  I have personally performed the services described in this document as transcribed by the above individual, and it is both accurate and complete.

## 2023-10-11 ENCOUNTER — TELEPHONE (OUTPATIENT)
Dept: UROLOGY | Facility: CLINIC | Age: 33
End: 2023-10-11
Payer: COMMERCIAL

## 2023-10-11 NOTE — TELEPHONE ENCOUNTER
PA for Polyethylene Glycol was sent to patient's insurance company. Medication is not covered under the patient's pharmacy benefit.                             This drug/product is not covered under the pharmacy benefit. Prior Authorization is not available.  Drug  Polyethylene Glycol 3350 17GM packets  Form  [employer] [place] Medicaid ePA Form 2017 NCPDP  Original Claim Info  70 TRANS FEE = 0.00PRODUCT EXCLUDED FROM COVERAGEPACKET NOT CVRD USE [id number] BULK

## 2023-10-17 ENCOUNTER — HOSPITAL ENCOUNTER (OUTPATIENT)
Dept: CT IMAGING | Facility: HOSPITAL | Age: 33
Discharge: HOME | End: 2023-10-17
Admitting: NURSE PRACTITIONER
Payer: COMMERCIAL

## 2023-10-17 PROCEDURE — 74176 CT ABD & PELVIS W/O CONTRAST: CPT

## 2023-10-18 ENCOUNTER — TELEPHONE (OUTPATIENT)
Dept: UROLOGY | Facility: CLINIC | Age: 33
End: 2023-10-18
Payer: COMMERCIAL

## 2023-10-18 DIAGNOSIS — N20.1 LEFT URETERAL STONE: Primary | ICD-10-CM

## 2023-10-18 DIAGNOSIS — R10.32 LEFT LOWER QUADRANT ABDOMINAL PAIN: ICD-10-CM

## 2023-10-18 DIAGNOSIS — R10.9 LEFT FLANK PAIN: ICD-10-CM

## 2023-10-18 RX ORDER — GENTAMICIN SULFATE 80 MG/100ML
80 INJECTION, SOLUTION INTRAVENOUS ONCE
Status: CANCELLED | OUTPATIENT
Start: 2023-10-18 | End: 2023-10-18

## 2023-10-18 NOTE — TELEPHONE ENCOUNTER
Called patient to check on her post clinic visit, and to discuss her  CT scan results at this time.  Patient still symptomatic for severe left flank, left lower quadrant abdominal pain, bladder pain and discomfort secondary to left UVJ stone.      She will be scheduled for left uteroscopy laser lithotripsy with possible left stent on Dr. Gruber on 10/23 of 2023.    Patient is agreeable plan of care.      FINDINGS:    LUNG BASES:  Unremarkable as visualized.  No mass.  No consolidation.      ABDOMEN:    LIVER:  Unremarkable as visualized.    GALLBLADDER AND BILE DUCTS:  Cholecystectomy clips.  No ductal  dilation.    PANCREAS:  Unremarkable as visualized.  No ductal dilation.    SPLEEN:  Mild splenomegaly.    ADRENALS:  Low-attenuation likely benign adenoma of the left adrenal  gland measuring 1.7 cm.    KIDNEYS AND URETERS:  Mild left renal collecting system prominence  that appears to be secondary to a distal left ureteral calculus  measuring about 7 mm.    STOMACH AND BOWEL:  Unremarkable as visualized.  No obstruction.  No  mucosal thickening.      PELVIS:    APPENDIX:  No findings to suggest acute appendicitis.    BLADDER:  Unremarkable as visualized.  No stones.    REPRODUCTIVE:  Unremarkable as visualized.      ABDOMEN and PELVIS:    INTRAPERITONEAL SPACE:  Unremarkable as visualized.  No free air.  No  significant fluid collection.    BONES/JOINTS:  No acute fracture.  No dislocation.    SOFT TISSUES:  Unremarkable as visualized.    VASCULATURE:  Unremarkable as visualized.  No abdominal aortic  aneurysm.    LYMPH NODES:  Unremarkable as visualized.  No enlarged lymph nodes.     IMPRESSION:  1.  Low-attenuation likely benign adenoma of the left adrenal gland  measuring 1.7 cm.  2.  Mild splenomegaly.  3.  Mild left renal collecting system prominence that appears to be  secondary to a distal left ureteral calculus measuring about 7 mm.

## 2023-10-20 ENCOUNTER — PRE-ADMISSION TESTING (OUTPATIENT)
Dept: PREADMISSION TESTING | Facility: HOSPITAL | Age: 33
End: 2023-10-20
Payer: COMMERCIAL

## 2023-10-20 LAB
ANION GAP SERPL CALCULATED.3IONS-SCNC: 10 MMOL/L (ref 5–15)
BUN SERPL-MCNC: 14 MG/DL (ref 6–20)
BUN/CREAT SERPL: 19.4 (ref 7–25)
CALCIUM SPEC-SCNC: 9.7 MG/DL (ref 8.6–10.5)
CHLORIDE SERPL-SCNC: 105 MMOL/L (ref 98–107)
CO2 SERPL-SCNC: 25 MMOL/L (ref 22–29)
CREAT SERPL-MCNC: 0.72 MG/DL (ref 0.57–1)
DEPRECATED RDW RBC AUTO: 41.2 FL (ref 37–54)
EGFRCR SERPLBLD CKD-EPI 2021: 115.5 ML/MIN/1.73
ERYTHROCYTE [DISTWIDTH] IN BLOOD BY AUTOMATED COUNT: 13.4 % (ref 12.3–15.4)
GLUCOSE SERPL-MCNC: 84 MG/DL (ref 65–99)
HCT VFR BLD AUTO: 43.4 % (ref 34–46.6)
HGB BLD-MCNC: 14.2 G/DL (ref 12–15.9)
MCH RBC QN AUTO: 28 PG (ref 26.6–33)
MCHC RBC AUTO-ENTMCNC: 32.7 G/DL (ref 31.5–35.7)
MCV RBC AUTO: 85.6 FL (ref 79–97)
PLATELET # BLD AUTO: 238 10*3/MM3 (ref 140–450)
PMV BLD AUTO: 11.4 FL (ref 6–12)
POTASSIUM SERPL-SCNC: 3.9 MMOL/L (ref 3.5–5.2)
RBC # BLD AUTO: 5.07 10*6/MM3 (ref 3.77–5.28)
SODIUM SERPL-SCNC: 140 MMOL/L (ref 136–145)
WBC NRBC COR # BLD: 6.13 10*3/MM3 (ref 3.4–10.8)

## 2023-10-20 PROCEDURE — 36415 COLL VENOUS BLD VENIPUNCTURE: CPT

## 2023-10-20 PROCEDURE — 80048 BASIC METABOLIC PNL TOTAL CA: CPT

## 2023-10-20 PROCEDURE — 85027 COMPLETE CBC AUTOMATED: CPT

## 2023-10-20 NOTE — DISCHARGE INSTRUCTIONS
HOLD all diabetic medications the morning of surgery as ordered by physician.    Please discontinue all blood thinners and anticoagulants (except aspirin) prior to surgery as per your surgeon and cardiologist instructions.  Aspirin may be continued up to the day prior to surgery.     10/23/23  ARRIVAL TIME PER DR NOWAK'S OFFICE    General Instructions:  Do not eat or drink after midnight:10/22/23  includes water, mints, or gum. You may brush your teeth.  Dental appliances that are removable must be taken out day of surgery.  Do not smoke, chew tobacco, or drink alcohol 24 hours prior to surgery.  Bring medications in original bottles, any inhalers and if applicable your C-PAP/BI-PAP machine.  Bring any papers given to you in the doctor's office.  Wear clean comfortable clothes and socks.  Do not wear contact lenses or make-up. Bring a case for your glasses if applicable.  Bring crutches or walker if applicable.  Leave all other valuables and jewelry at home.    If you were given a blood bank ID arm band remember to bring it with you the day of surgery.    Preventing a Surgical Site Infection:  Shower the night before surgery (unless instructed other wise) using a fresh bar of anti-bacterial soap (such as Dial) and clean washcloth. Dry with a clean towel and dress in clean clothing.  For 2 to 3 days before surgery, avoid shaving with a razor near where you will have surgery because the razor can irritate skin and make it easier to develop an infection. Ask your surgeon if you will be receiving antibiotics prior to surgery.  Make sure you, your family, and all healthcare providers clear their hands with soap and water or an alcohol-based hand  before caring for you or your wound.  If at all possible, quit smoking as many days before surgery as you can.    Day of surgery:  Upon arrival, a Pre-op nurse and Anesthesiologist will review your health history, obtain vital signs, and answer questions you may have.  The only belongings needed at this time will be your home medications and if applicable your C-PAP/BI-PAP machine. If you are staying overnight your family can leave the rest of your belongings in the car and bring them to your room later. A Pre-op nurse will start an IV and you may receive medication in preparation for surgery, including something to help you relax. Your family will be able to see you in the Pre-op area. While you are in surgery your family should notify the waiting room  if they leave the waiting room area and provide a contact phone number.    Please be aware that surgery does come with discomfort. We want to make every effort to control your discomfort so please discuss any uncontrolled symptoms with your nurse. Your doctor will most likely have prescribed pain medications.    If you are going home after surgery you will receive individualized written care instructions before being discharged. A responsible adult must drive you to and from the hospital on the day of surgery and stay with you for 24 hours.    If you are staying overnight following surgery, you will be transported to your hospital room following the recovery period.  [facility] has all private rooms.    If you have any questions please call Pre-Admission Testing at [phone].  Deductibles and co-payments are collected on the day of service. Please be prepared to pay the required co-pay, deductible or deposit on the day of service as defined by your plan.    A RESPONSIBLE PERSON MUST REMAIN IN THE WAITING ROOM DURING YOUR PROCEDURE AND A RESPONSIBLE  MUST BE AVAILABLE UPON YOUR DISCHARGE.

## 2023-10-23 ENCOUNTER — APPOINTMENT (OUTPATIENT)
Dept: GENERAL RADIOLOGY | Facility: HOSPITAL | Age: 33
End: 2023-10-23
Payer: COMMERCIAL

## 2023-10-23 ENCOUNTER — ANESTHESIA EVENT (OUTPATIENT)
Dept: PERIOP | Facility: HOSPITAL | Age: 33
End: 2023-10-23
Payer: COMMERCIAL

## 2023-10-23 ENCOUNTER — HOSPITAL ENCOUNTER (OUTPATIENT)
Facility: HOSPITAL | Age: 33
Setting detail: HOSPITAL OUTPATIENT SURGERY
Discharge: HOME | End: 2023-10-23
Attending: UROLOGY | Admitting: UROLOGY
Payer: COMMERCIAL

## 2023-10-23 ENCOUNTER — ANESTHESIA (OUTPATIENT)
Dept: PERIOP | Facility: HOSPITAL | Age: 33
End: 2023-10-23
Payer: COMMERCIAL

## 2023-10-23 VITALS
HEART RATE: 70 BPM | DIASTOLIC BLOOD PRESSURE: 74 MMHG | BODY MASS INDEX: 40.15 KG/M2 | TEMPERATURE: 98 F | OXYGEN SATURATION: 98 % | HEIGHT: 65 IN | RESPIRATION RATE: 18 BRPM | SYSTOLIC BLOOD PRESSURE: 115 MMHG | WEIGHT: 241 LBS

## 2023-10-23 DIAGNOSIS — R10.9 LEFT FLANK PAIN: ICD-10-CM

## 2023-10-23 DIAGNOSIS — R10.32 LEFT LOWER QUADRANT ABDOMINAL PAIN: ICD-10-CM

## 2023-10-23 DIAGNOSIS — N20.1 LEFT URETERAL STONE: ICD-10-CM

## 2023-10-23 PROCEDURE — 52356 CYSTO/URETERO W/LITHOTRIPSY: CPT | Performed by: UROLOGY

## 2023-10-23 PROCEDURE — 25010000002 GENTAMICIN PER 80 MG: Performed by: NURSE PRACTITIONER

## 2023-10-23 PROCEDURE — 25010000002 ONDANSETRON PER 1 MG: Performed by: NURSE ANESTHETIST, CERTIFIED REGISTERED

## 2023-10-23 PROCEDURE — 25010000002 PROPOFOL 200 MG/20ML EMULSION: Performed by: NURSE ANESTHETIST, CERTIFIED REGISTERED

## 2023-10-23 PROCEDURE — 74420 UROGRAPHY RTRGR +-KUB: CPT | Performed by: UROLOGY

## 2023-10-23 PROCEDURE — 25010000002 FENTANYL CITRATE (PF) 50 MCG/ML SOLUTION: Performed by: NURSE ANESTHETIST, CERTIFIED REGISTERED

## 2023-10-23 PROCEDURE — 25010000002 MIDAZOLAM PER 1 MG: Performed by: NURSE ANESTHETIST, CERTIFIED REGISTERED

## 2023-10-23 PROCEDURE — 25010000002 MEPERIDINE PER 100 MG: Performed by: NURSE ANESTHETIST, CERTIFIED REGISTERED

## 2023-10-23 PROCEDURE — 25510000001 IOPAMIDOL 61 % SOLUTION: Performed by: UROLOGY

## 2023-10-23 PROCEDURE — 82365 CALCULUS SPECTROSCOPY: CPT | Performed by: UROLOGY

## 2023-10-23 PROCEDURE — C2617 STENT, NON-COR, TEM W/O DEL: HCPCS | Performed by: UROLOGY

## 2023-10-23 PROCEDURE — C1769 GUIDE WIRE: HCPCS | Performed by: UROLOGY

## 2023-10-23 PROCEDURE — 76000 FLUOROSCOPY <1 HR PHYS/QHP: CPT

## 2023-10-23 PROCEDURE — 25810000003 LACTATED RINGERS PER 1000 ML: Performed by: ANESTHESIOLOGY

## 2023-10-23 PROCEDURE — 25510000001 IOPAMIDOL 61 % SOLUTION

## 2023-10-23 DEVICE — URETERAL STENT
Type: IMPLANTABLE DEVICE | Site: URETER | Status: FUNCTIONAL
Brand: POLARIS™ ULTRA

## 2023-10-23 RX ORDER — PROPOFOL 10 MG/ML
INJECTION, EMULSION INTRAVENOUS AS NEEDED
Status: DISCONTINUED | OUTPATIENT
Start: 2023-10-23 | End: 2023-10-23 | Stop reason: SURG

## 2023-10-23 RX ORDER — MIDAZOLAM HYDROCHLORIDE 1 MG/ML
INJECTION INTRAMUSCULAR; INTRAVENOUS AS NEEDED
Status: DISCONTINUED | OUTPATIENT
Start: 2023-10-23 | End: 2023-10-23 | Stop reason: SURG

## 2023-10-23 RX ORDER — LIDOCAINE HYDROCHLORIDE 20 MG/ML
JELLY TOPICAL AS NEEDED
Status: DISCONTINUED | OUTPATIENT
Start: 2023-10-23 | End: 2023-10-23 | Stop reason: HOSPADM

## 2023-10-23 RX ORDER — OXYCODONE HYDROCHLORIDE AND ACETAMINOPHEN 5; 325 MG/1; MG/1
1 TABLET ORAL ONCE AS NEEDED
Status: COMPLETED | OUTPATIENT
Start: 2023-10-23 | End: 2023-10-23

## 2023-10-23 RX ORDER — SODIUM CHLORIDE 0.9 % (FLUSH) 0.9 %
10 SYRINGE (ML) INJECTION AS NEEDED
Status: DISCONTINUED | OUTPATIENT
Start: 2023-10-23 | End: 2023-10-23 | Stop reason: HOSPADM

## 2023-10-23 RX ORDER — SCOLOPAMINE TRANSDERMAL SYSTEM 1 MG/1
1 PATCH, EXTENDED RELEASE TRANSDERMAL ONCE
Status: DISCONTINUED | OUTPATIENT
Start: 2023-10-23 | End: 2023-10-23 | Stop reason: HOSPADM

## 2023-10-23 RX ORDER — FENTANYL CITRATE 50 UG/ML
INJECTION, SOLUTION INTRAMUSCULAR; INTRAVENOUS AS NEEDED
Status: DISCONTINUED | OUTPATIENT
Start: 2023-10-23 | End: 2023-10-23 | Stop reason: SURG

## 2023-10-23 RX ORDER — FENTANYL CITRATE 50 UG/ML
50 INJECTION, SOLUTION INTRAMUSCULAR; INTRAVENOUS
Status: DISCONTINUED | OUTPATIENT
Start: 2023-10-23 | End: 2023-10-23 | Stop reason: HOSPADM

## 2023-10-23 RX ORDER — SODIUM CHLORIDE 0.9 % (FLUSH) 0.9 %
10 SYRINGE (ML) INJECTION EVERY 12 HOURS SCHEDULED
Status: DISCONTINUED | OUTPATIENT
Start: 2023-10-23 | End: 2023-10-23 | Stop reason: HOSPADM

## 2023-10-23 RX ORDER — ONDANSETRON 2 MG/ML
4 INJECTION INTRAMUSCULAR; INTRAVENOUS AS NEEDED
Status: DISCONTINUED | OUTPATIENT
Start: 2023-10-23 | End: 2023-10-23 | Stop reason: HOSPADM

## 2023-10-23 RX ORDER — SODIUM CHLORIDE, SODIUM LACTATE, POTASSIUM CHLORIDE, CALCIUM CHLORIDE 600; 310; 30; 20 MG/100ML; MG/100ML; MG/100ML; MG/100ML
125 INJECTION, SOLUTION INTRAVENOUS ONCE
Status: COMPLETED | OUTPATIENT
Start: 2023-10-23 | End: 2023-10-23

## 2023-10-23 RX ORDER — IPRATROPIUM BROMIDE AND ALBUTEROL SULFATE 2.5; .5 MG/3ML; MG/3ML
3 SOLUTION RESPIRATORY (INHALATION) ONCE AS NEEDED
Status: DISCONTINUED | OUTPATIENT
Start: 2023-10-23 | End: 2023-10-23 | Stop reason: HOSPADM

## 2023-10-23 RX ORDER — SODIUM CHLORIDE, SODIUM LACTATE, POTASSIUM CHLORIDE, CALCIUM CHLORIDE 600; 310; 30; 20 MG/100ML; MG/100ML; MG/100ML; MG/100ML
100 INJECTION, SOLUTION INTRAVENOUS ONCE AS NEEDED
Status: DISCONTINUED | OUTPATIENT
Start: 2023-10-23 | End: 2023-10-23 | Stop reason: HOSPADM

## 2023-10-23 RX ORDER — SODIUM CHLORIDE 9 MG/ML
40 INJECTION, SOLUTION INTRAVENOUS AS NEEDED
Status: DISCONTINUED | OUTPATIENT
Start: 2023-10-23 | End: 2023-10-23 | Stop reason: HOSPADM

## 2023-10-23 RX ORDER — MAGNESIUM HYDROXIDE 1200 MG/15ML
LIQUID ORAL AS NEEDED
Status: DISCONTINUED | OUTPATIENT
Start: 2023-10-23 | End: 2023-10-23 | Stop reason: HOSPADM

## 2023-10-23 RX ORDER — MEPERIDINE HYDROCHLORIDE 25 MG/ML
12.5 INJECTION INTRAMUSCULAR; INTRAVENOUS; SUBCUTANEOUS
Status: COMPLETED | OUTPATIENT
Start: 2023-10-23 | End: 2023-10-23

## 2023-10-23 RX ORDER — GENTAMICIN SULFATE 80 MG/100ML
80 INJECTION, SOLUTION INTRAVENOUS ONCE
Status: COMPLETED | OUTPATIENT
Start: 2023-10-23 | End: 2023-10-23

## 2023-10-23 RX ORDER — FAMOTIDINE 10 MG/ML
INJECTION, SOLUTION INTRAVENOUS AS NEEDED
Status: DISCONTINUED | OUTPATIENT
Start: 2023-10-23 | End: 2023-10-23 | Stop reason: SURG

## 2023-10-23 RX ORDER — MIDAZOLAM HYDROCHLORIDE 1 MG/ML
1 INJECTION INTRAMUSCULAR; INTRAVENOUS
Status: DISCONTINUED | OUTPATIENT
Start: 2023-10-23 | End: 2023-10-23 | Stop reason: HOSPADM

## 2023-10-23 RX ORDER — ONDANSETRON 2 MG/ML
INJECTION INTRAMUSCULAR; INTRAVENOUS AS NEEDED
Status: DISCONTINUED | OUTPATIENT
Start: 2023-10-23 | End: 2023-10-23 | Stop reason: SURG

## 2023-10-23 RX ORDER — HYDROCODONE BITARTRATE AND ACETAMINOPHEN 10; 325 MG/1; MG/1
1 TABLET ORAL EVERY 6 HOURS PRN
Qty: 12 TABLET | Refills: 0 | Status: SHIPPED | OUTPATIENT
Start: 2023-10-23

## 2023-10-23 RX ADMIN — GENTAMICIN SULFATE 80 MG: 80 INJECTION, SOLUTION INTRAVENOUS at 10:42

## 2023-10-23 RX ADMIN — MEPERIDINE HYDROCHLORIDE 12.5 MG: 25 INJECTION INTRAMUSCULAR; INTRAVENOUS; SUBCUTANEOUS at 11:58

## 2023-10-23 RX ADMIN — SODIUM CHLORIDE, POTASSIUM CHLORIDE, SODIUM LACTATE AND CALCIUM CHLORIDE: 600; 310; 30; 20 INJECTION, SOLUTION INTRAVENOUS at 09:01

## 2023-10-23 RX ADMIN — MEPERIDINE HYDROCHLORIDE 12.5 MG: 25 INJECTION INTRAMUSCULAR; INTRAVENOUS; SUBCUTANEOUS at 11:53

## 2023-10-23 RX ADMIN — OXYCODONE HYDROCHLORIDE AND ACETAMINOPHEN 1 TABLET: 5; 325 TABLET ORAL at 11:53

## 2023-10-23 RX ADMIN — ONDANSETRON 4 MG: 2 INJECTION INTRAMUSCULAR; INTRAVENOUS at 10:42

## 2023-10-23 RX ADMIN — SCOPALAMINE 1 PATCH: 1 PATCH, EXTENDED RELEASE TRANSDERMAL at 09:14

## 2023-10-23 RX ADMIN — ONDANSETRON 4 MG: 2 INJECTION INTRAMUSCULAR; INTRAVENOUS at 11:45

## 2023-10-23 RX ADMIN — FAMOTIDINE 20 MG: 10 INJECTION, SOLUTION INTRAVENOUS at 10:42

## 2023-10-23 RX ADMIN — FENTANYL CITRATE 50 MCG: 50 INJECTION, SOLUTION INTRAMUSCULAR; INTRAVENOUS at 11:53

## 2023-10-23 RX ADMIN — PROPOFOL 180 MG: 10 INJECTION, EMULSION INTRAVENOUS at 10:45

## 2023-10-23 RX ADMIN — MIDAZOLAM HYDROCHLORIDE 2 MG: 1 INJECTION, SOLUTION INTRAMUSCULAR; INTRAVENOUS at 10:42

## 2023-10-23 RX ADMIN — FENTANYL CITRATE 50 MCG: 50 INJECTION, SOLUTION INTRAMUSCULAR; INTRAVENOUS at 10:55

## 2023-10-23 RX ADMIN — FENTANYL CITRATE 50 MCG: 50 INJECTION, SOLUTION INTRAMUSCULAR; INTRAVENOUS at 11:35

## 2023-10-23 RX ADMIN — FENTANYL CITRATE 50 MCG: 50 INJECTION, SOLUTION INTRAMUSCULAR; INTRAVENOUS at 11:01

## 2023-10-23 NOTE — ANESTHESIA POSTPROCEDURE EVALUATION
Patient: Romeo Lisa    Procedure Summary       Date: 10/23/23 Room / Location:  [facility]  /  [facility]    Anesthesia Start: 1042 Anesthesia Stop: 1112    Procedures:       URETEROSCOPY LASER LITHOTRIPSY (Left)      CYSTOSCOPY RETROGRADE PYELOGRAM (Left)      STENT INSERTION (Left) Diagnosis:       Left ureteral stone      Left flank pain      Left lower quadrant abdominal pain      (Left ureteral stone [N20.1])      (Left flank pain [R10.9])      (Left lower quadrant abdominal pain [R10.32])    Surgeons: Ananth Gruber MD Provider: Govind Sanchez DO    Anesthesia Type: general ASA Status: 3            Anesthesia Type: general    Vitals  Vitals Value Taken Time   /63 10/23/23 1123   Temp 97 °F (36.1 °C) 10/23/23 1113   Pulse 77 10/23/23 1125   Resp 16 10/23/23 1123   SpO2 98 % 10/23/23 1125   Vitals shown include unfiled device data.        Post Anesthesia Care and Evaluation    Patient location during evaluation: bedside  Patient participation: complete - patient participated  Level of consciousness: awake and alert  Pain score: 1  Pain management: adequate    Airway patency: patent  Anesthetic complications: No anesthetic complications  PONV Status: none  Cardiovascular status: acceptable  Respiratory status: acceptable  Hydration status: acceptable

## 2023-10-23 NOTE — ANESTHESIA PROCEDURE NOTES
Airway  Urgency: elective    Date/Time: 10/23/2023 10:46 AM  End Time:10/23/2023 10:46 AM    General Information and Staff    Patient location during procedure: OR  CRNA/CAA: Jono Pearson CRNA    Indications and Patient Condition  Indications for airway management: airway protection    Preoxygenated: yes  MILS maintained throughout  Mask difficulty assessment: 0 - not attempted    Final Airway Details  Final airway type: supraglottic airway      Successful airway: unique  Size 3  Airway Seal Pressure (cm H2O): 20     Number of attempts at approach: 1  Assessment: lips, teeth, and gum same as pre-op and atraumatic intubation    Additional Comments  Atraumatic

## 2023-10-23 NOTE — OP NOTE
Charlimargie Lisa  10/23/2023    Pre-op Diagnosis:   Left ureteral stone [N20.1]  Left flank pain [R10.9]  Left lower quadrant abdominal pain [R10.32]    Post-op Diagnosis:     Post-Op Diagnosis Codes:     * Left ureteral stone [N20.1]     * Left flank pain [R10.9]     * Left lower quadrant abdominal pain [R10.32]    Procedure/CPT® Codes:  32-year-old white female with a left distal ureteral calculus.  Following an informed consent brought the procedure suite.  Prepped and draped in the low dorsolithotomy position I used the Kessler ureteroscope she had a normal bladder and up the left orifice identified a stone that was quite pale used the laser broke into several pieces and extracted each piece sequentially careful retrograde showed poor drainage with a little bit hydronephrosis because of the swelling I recommended a stent with an attached lanyard I chose a 5 x 24 double-J stent which I inserted under fluoroscopic monitoring without difficulty.  There was no extravasation noted.  It was tolerated well    Procedure(s):  URETEROSCOPY LASER LITHOTRIPSY  CYSTOSCOPY RETROGRADE PYELOGRAM  STENT INSERTION    Surgeon(s):  Ananth Gruber MD    Anesthesia: see anesthesia record    Staff:   Circulator: Kajal Lopez RN  Scrub Person: Martha Benedict LPN; Terri Avila    Estimated Blood Loss: none  Urine Voided: * No values recorded between 10/23/2023 10:42 AM and 10/23/2023 11:11 AM *    Specimens:                ID Type Source Tests Collected by Time   1 :  Calculus Kidney, Left STONE ANALYSIS Ananth Gruber MD 10/23/2023 1100         Drains: 5 x 24 double-J stent    Findings: Distal left stone ureteral edema    Blood: N/A    Complications: None    Grafts and Implants: None    Ananth Gruber MD     Date: 10/23/2023  Time: 11:13 EDT

## 2023-10-23 NOTE — ANESTHESIA PREPROCEDURE EVALUATION
Anesthesia Evaluation     Patient summary reviewed and Nursing notes reviewed   no history of anesthetic complications:   NPO Solid Status: > 8 hours  NPO Liquid Status: > 8 hours           Airway   Mallampati: II  TM distance: >3 FB  Neck ROM: full  No difficulty expected  Dental - normal exam     Pulmonary - negative pulmonary ROS and normal exam    breath sounds clear to auscultation  Cardiovascular - negative cardio ROS and normal exam    Rhythm: regular  Rate: normal        Neuro/Psych- negative ROS  GI/Hepatic/Renal/Endo    (+) obesity, morbid obesity (class III morbid obesity), thyroid problem     Musculoskeletal (-) negative ROS    Abdominal   (+) obese   Substance History - negative use     OB/GYN negative ob/gyn ROS         Other   blood dyscrasia anemia,   history of cancer remission                Anesthesia Plan    ASA 3     general     (Scop 1.5mg TD for PONV prophylaxis.  )  intravenous induction     Anesthetic plan, risks, benefits, and alternatives have been provided, discussed and informed consent has been obtained with: patient.  Pre-procedure education provided  Plan discussed with CRNA.    CODE STATUS:

## 2023-10-24 ENCOUNTER — OFFICE VISIT (OUTPATIENT)
Dept: UROLOGY | Facility: CLINIC | Age: 33
End: 2023-10-24
Payer: COMMERCIAL

## 2023-10-24 VITALS
DIASTOLIC BLOOD PRESSURE: 82 MMHG | HEIGHT: 65 IN | BODY MASS INDEX: 39.22 KG/M2 | WEIGHT: 235.4 LBS | HEART RATE: 89 BPM | SYSTOLIC BLOOD PRESSURE: 120 MMHG

## 2023-10-24 DIAGNOSIS — N20.1 LEFT URETERAL STONE: Primary | ICD-10-CM

## 2023-10-24 PROCEDURE — 1159F MED LIST DOCD IN RCRD: CPT | Performed by: UROLOGY

## 2023-10-24 PROCEDURE — 99213 OFFICE O/P EST LOW 20 MIN: CPT | Performed by: UROLOGY

## 2023-10-24 PROCEDURE — 1160F RVW MEDS BY RX/DR IN RCRD: CPT | Performed by: UROLOGY

## 2023-10-24 NOTE — PROGRESS NOTES
Chief Complaint:      Chief Complaint   Patient presents with    Nephrolithiasis     Post op        HPI:   32 y.o. female returns today postop ureteroscopy.  Because of potential for swelling I left a stent with an attached lanyard it was removed today I will see her back in 3 months with KUB we discussed appropriate options for avoidance.    Past Medical History:     Past Medical History:   Diagnosis Date    Anemia     Bronchitis     Cancer     uterine    Disease of thyroid gland     History of transfusion     PONV (postoperative nausea and vomiting)        Current Meds:     Current Outpatient Medications   Medication Sig Dispense Refill    HYDROcodone-acetaminophen (NORCO)  MG per tablet Take 1 tablet by mouth Every 6 (Six) Hours As Needed for Moderate Pain (Pain). 12 tablet 0    levothyroxine (SYNTHROID, LEVOTHROID) 88 MCG tablet Take 1 tablet by mouth Daily.      Mounjaro 12.5 MG/0.5ML solution pen-injector INJECT 12.5MG UNDER THE SKIN ONCE WEEKLY      ondansetron (Zofran) 4 MG tablet Take 1 tablet by mouth Every 12 (Twelve) Hours As Needed for Nausea. 20 tablet 0    polyethylene glycol (MiraLax) 17 g packet Take 17 g by mouth Daily. 30 each 3    senna (Senokot) 8.6 MG tablet Take 1 tablet by mouth Daily. 60 tablet 1    tamsulosin (FLOMAX) 0.4 MG capsule 24 hr capsule Take 1 capsule by mouth Daily. 30 capsule 5    Ventolin  (90 Base) MCG/ACT inhaler Inhale 2 puffs Every 4 (Four) Hours As Needed.       No current facility-administered medications for this visit.        Allergies:      No Known Allergies     Past Surgical History:     Past Surgical History:   Procedure Laterality Date    ABDOMINAL SURGERY      ENDOSCOPY      GASTRECTOMY      GASTRIC SLEEVE LAPAROSCOPIC      HYSTERECTOMY      LAPAROSCOPIC CHOLECYSTECTOMY         Social History:     Social History     Socioeconomic History    Marital status: Single   Tobacco Use    Smoking status: Never    Smokeless tobacco: Never   Vaping Use    Vaping  Use: Never used   Substance and Sexual Activity    Alcohol use: Never    Drug use: Never    Sexual activity: Defer       Family History:     No family history on file.    Review of Systems:     Review of Systems   Constitutional: Negative.  Negative for activity change, appetite change, chills, diaphoresis, fatigue and unexpected weight change.   HENT:  Negative for congestion, dental problem, drooling, ear discharge, ear pain, facial swelling, hearing loss, mouth sores, nosebleeds, postnasal drip, rhinorrhea, sinus pressure, sneezing, sore throat, tinnitus, trouble swallowing and voice change.    Eyes: Negative.  Negative for photophobia, pain, discharge, redness, itching and visual disturbance.   Respiratory: Negative.  Negative for apnea, cough, choking, chest tightness, shortness of breath, wheezing and stridor.    Cardiovascular: Negative.  Negative for chest pain, palpitations and leg swelling.   Gastrointestinal: Negative.  Negative for abdominal distention, abdominal pain, anal bleeding, blood in stool, constipation, diarrhea, nausea, rectal pain and vomiting.   Endocrine: Negative.  Negative for cold intolerance, heat intolerance, polydipsia, polyphagia and polyuria.   Musculoskeletal: Negative.  Negative for arthralgias, back pain, gait problem, joint swelling, myalgias, neck pain and neck stiffness.   Skin: Negative.  Negative for color change, pallor, rash and wound.   Allergic/Immunologic: Negative.  Negative for environmental allergies, food allergies and immunocompromised state.   Neurological: Negative.  Negative for dizziness, tremors, seizures, syncope, facial asymmetry, speech difficulty, weakness, light-headedness, numbness and headaches.   Hematological: Negative.  Negative for adenopathy. Does not bruise/bleed easily.   Psychiatric/Behavioral:  Negative for agitation, behavioral problems, confusion, decreased concentration, dysphoric mood, hallucinations, self-injury, sleep disturbance and  suicidal ideas. The patient is not nervous/anxious and is not hyperactive.    All other systems reviewed and are negative.      Physical Exam:     Physical Exam  Constitutional:       Appearance: She is well-developed.   HENT:      Head: Normocephalic and atraumatic.      Right Ear: External ear normal.      Left Ear: External ear normal.   Eyes:      Conjunctiva/sclera: Conjunctivae normal.      Pupils: Pupils are equal, round, and reactive to light.   Cardiovascular:      Rate and Rhythm: Normal rate and regular rhythm.      Heart sounds: Normal heart sounds.   Pulmonary:      Effort: Pulmonary effort is normal.      Breath sounds: Normal breath sounds.   Abdominal:      General: Bowel sounds are normal. There is no distension.      Palpations: Abdomen is soft. There is no mass.      Tenderness: There is no abdominal tenderness. There is no guarding or rebound.   Genitourinary:     Vagina: No vaginal discharge.   Musculoskeletal:         General: Normal range of motion.   Skin:     General: Skin is warm and dry.   Neurological:      Mental Status: She is alert.      Deep Tendon Reflexes: Reflexes are normal and symmetric.   Psychiatric:         Behavior: Behavior normal.         Thought Content: Thought content normal.         Judgment: Judgment normal.         I have reviewed the following portions of the patient's history: Allergies, current medications, past family history, past medical history, past social history, past surgical history, problem list, and ROS and confirm it is accurate.    Recent Image (CT and/or KUB):      CT Abdomen and Pelvis: No results found for this or any previous visit.       CT Stone Protocol: Results for orders placed in visit on 10/09/23    CT Abdomen Pelvis Stone Protocol    Narrative  EXAM:  CT Abdomen and Pelvis Without Intravenous Contrast    EXAM DATE:  10/17/2023 9:48 AM    CLINICAL HISTORY:  Flank pain, kidney stone suspected; R10.9-Unspecified abdominal pain;  N20.1-Calculus  of ureter; R10.32-Left lower quadrant pain;  R10.9-Unspecified abdominal pain    TECHNIQUE:  Axial computed tomography images of the abdomen and pelvis without  intravenous contrast.  Sagittal and coronal reformatted images were  created and reviewed.  This CT exam was performed using one or more of  the following dose reduction techniques:  automated exposure control,  adjustment of the mA and/or kV according to patient size, and/or use of  iterative reconstruction technique.    COMPARISON:  No relevant prior studies available.    FINDINGS:  LUNG BASES:  Unremarkable as visualized.  No mass.  No consolidation.    ABDOMEN:  LIVER:  Unremarkable as visualized.  GALLBLADDER AND BILE DUCTS:  Cholecystectomy clips.  No ductal  dilation.  PANCREAS:  Unremarkable as visualized.  No ductal dilation.  SPLEEN:  Mild splenomegaly.  ADRENALS:  Low-attenuation likely benign adenoma of the left adrenal  gland measuring 1.7 cm.  KIDNEYS AND URETERS:  Mild left renal collecting system prominence  that appears to be secondary to a distal left ureteral calculus  measuring about 7 mm.  STOMACH AND BOWEL:  Unremarkable as visualized.  No obstruction.  No  mucosal thickening.    PELVIS:  APPENDIX:  No findings to suggest acute appendicitis.  BLADDER:  Unremarkable as visualized.  No stones.  REPRODUCTIVE:  Unremarkable as visualized.    ABDOMEN and PELVIS:  INTRAPERITONEAL SPACE:  Unremarkable as visualized.  No free air.  No  significant fluid collection.  BONES/JOINTS:  No acute fracture.  No dislocation.  SOFT TISSUES:  Unremarkable as visualized.  VASCULATURE:  Unremarkable as visualized.  No abdominal aortic  aneurysm.  LYMPH NODES:  Unremarkable as visualized.  No enlarged lymph nodes.    Impression  1.  Low-attenuation likely benign adenoma of the left adrenal gland  measuring 1.7 cm.  2.  Mild splenomegaly.  3.  Mild left renal collecting system prominence that appears to be  secondary to a distal left ureteral calculus  measuring about 7 mm.      This report was finalized on 10/17/2023 9:11 AM by Dr. Charles Wynne MD.       KUB: Results for orders placed in visit on 07/27/23    XR abdomen kub    Narrative  EXAM:  XR Abdomen, 1 View    EXAM DATE:  7/27/2023 11:23 AM    CLINICAL HISTORY:  LEFT URETERAL STONE/FLANK PAIN; N20.1-Calculus of ureter;  R10.9-Unspecified abdominal pain; N30.01-Acute cystitis with hematuria    TECHNIQUE:  Frontal supine view of the abdomen/pelvis.    COMPARISON:  No relevant prior studies available.    FINDINGS:  GASTROINTESTINAL TRACT:  Unremarkable.  No dilation.  BONES/JOINTS:  Unremarkable.  VASCULATURE:  Vascular phleboliths in the pelvis.    Impression  No acute findings.  Vascular phleboliths in the pelvis.    This report was finalized on 7/27/2023 11:30 AM by Dr. Charles Wynne MD.       Labs (past 3 months):      Pre-Admission Testing on 10/20/2023   Component Date Value Ref Range Status    Glucose 10/20/2023 84  65 - 99 mg/dL Final    BUN 10/20/2023 14  6 - 20 mg/dL Final    Creatinine 10/20/2023 0.72  0.57 - 1.00 mg/dL Final    Sodium 10/20/2023 140  136 - 145 mmol/L Final    Potassium 10/20/2023 3.9  3.5 - 5.2 mmol/L Final    Chloride 10/20/2023 105  98 - 107 mmol/L Final    CO2 10/20/2023 25.0  22.0 - 29.0 mmol/L Final    Calcium 10/20/2023 9.7  8.6 - 10.5 mg/dL Final    BUN/Creatinine Ratio 10/20/2023 19.4  7.0 - 25.0 Final    Anion Gap 10/20/2023 10.0  5.0 - 15.0 mmol/L Final    eGFR 10/20/2023 115.5  >60.0 mL/min/1.73 Final    WBC 10/20/2023 6.13  3.40 - 10.80 10*3/mm3 Final    RBC 10/20/2023 5.07  3.77 - 5.28 10*6/mm3 Final    Hemoglobin 10/20/2023 14.2  12.0 - 15.9 g/dL Final    Hematocrit 10/20/2023 43.4  34.0 - 46.6 % Final    MCV 10/20/2023 85.6  79.0 - 97.0 fL Final    MCH 10/20/2023 28.0  26.6 - 33.0 pg Final    MCHC 10/20/2023 32.7  31.5 - 35.7 g/dL Final    RDW 10/20/2023 13.4  12.3 - 15.4 % Final    RDW-SD 10/20/2023 41.2  37.0 - 54.0 fl Final    MPV 10/20/2023 11.4  6.0 - 12.0 fL  Final    Platelets 10/20/2023 238  140 - 450 10*3/mm3 Final   Office Visit on 10/09/2023   Component Date Value Ref Range Status    Color 10/09/2023 Yellow  Yellow, Straw, Dark Yellow, Andreina Final    Clarity, UA 10/09/2023 Cloudy (A)  Clear Final    Specific Gravity  10/09/2023 1.025  1.005 - 1.030 Final    pH, Urine 10/09/2023 6.0  5.0 - 8.0 Final    Leukocytes 10/09/2023 Small (1+) (A)  Negative Final    Nitrite, UA 10/09/2023 Negative  Negative Final    Protein, POC 10/09/2023 1+ (A)  Negative mg/dL Final    Glucose, UA 10/09/2023 Negative  Negative mg/dL Final    Ketones, UA 10/09/2023 Negative  Negative Final    Urobilinogen, UA 10/09/2023 0.2 E.U./dL  Normal, 0.2 E.U./dL Final    Bilirubin 10/09/2023 Small (1+) (A)  Negative Final    Blood, UA 10/09/2023 Negative  Negative Final    Lot Number 10/09/2023 [id number]   Final    Expiration Date 10/09/2023 10/23/2024   Final        Procedure:       Assessment/Plan:   Ureteral calculus-patient has been diagnosed with a ureteral calculus.  We have discussed the various parameters regarding spontaneous passage including the notion that a 2 mm stone has a high likelihood of spontaneous passage versus a larger stone being caught up in the upper areas of the urinary tract.  We also discussed the medical management of stone disease and the use of medical expulsive therapy in the form of Flomax.  This is used in an off label setting.  We discussed the indicators for intervention including  absolute indicators such as sepsis and uncontrollable severe pain, as well as the relative indicators of moderate pain that is well-controlled with various analgesia.  I also talked about nonoperative management including ambulation and increasing fluids and hot tub as being an effective adjuncts in the treatment of a ureteral stone.  Had a distal ureteral calculus.  The surgery went well.  A stent was left with an attached lanyard.  I will see her back in 3 months with a  SELVIN.  Metabolic stone disease-discussed her stone analysis, discussed work-up including a 24-hour Litholink where indicated.  Discussed medical therapy including thiazide and Urocit-K that are specific to specific types of stones, discussed increasing fluids and avoidance of cola products and anything containing high amounts of oxalates.              This document has been electronically signed by HARITHA NOWAK MD October 24, 2023 13:07 EDT    Dictated Utilizing Dragon Dictation: Part of this note may be an electronic transcription/translation of spoken language to printed text using the Dragon Dictation System.

## 2023-10-25 ENCOUNTER — TELEPHONE (OUTPATIENT)
Dept: UROLOGY | Facility: CLINIC | Age: 33
End: 2023-10-25
Payer: COMMERCIAL

## 2023-10-25 NOTE — TELEPHONE ENCOUNTER
Patient called stating that she just had surgery with Dr Gruber and her pharmacy was out of the pain medication. She wanted to know if he would send them to [facility] in [place] (she did confirm that they currently had it in stock)

## 2023-10-29 LAB
CALCIUM OXALATE DIHYDRATE MFR STONE IR: 30 %
COLOR STONE: NORMAL
COM MFR STONE: 50 %
COMPN STONE: NORMAL
HYDROXYAPATITE: 20 %
LABORATORY COMMENT REPORT: NORMAL
Lab: NORMAL
Lab: NORMAL
PHOTO: NORMAL
SIZE STONE: NORMAL MM
SPEC SOURCE SUBJ: NORMAL
STONE ANALYSIS-IMP: NORMAL
WT STONE: 14 MG

## (undated) DEVICE — NITINOL STONE RETRIEVAL BASKET: Brand: ZERO TIP

## (undated) DEVICE — FIBR LASR FLEXIVAPULSE365 HOLMIUM FLT/TP 1P/U

## (undated) DEVICE — GW ZIPWIRE STD/SHFT STR TPR/3CM .035IN 150CM

## (undated) DEVICE — COR CYSTO: Brand: MEDLINE INDUSTRIES, INC.